# Patient Record
Sex: FEMALE | Race: ASIAN
[De-identification: names, ages, dates, MRNs, and addresses within clinical notes are randomized per-mention and may not be internally consistent; named-entity substitution may affect disease eponyms.]

---

## 2019-10-03 ENCOUNTER — HOSPITAL ENCOUNTER (EMERGENCY)
Dept: HOSPITAL 74 - JER | Age: 34
Discharge: HOME | End: 2019-10-03
Payer: COMMERCIAL

## 2019-10-03 VITALS — SYSTOLIC BLOOD PRESSURE: 122 MMHG | HEART RATE: 110 BPM | TEMPERATURE: 98.2 F | DIASTOLIC BLOOD PRESSURE: 92 MMHG

## 2019-10-03 VITALS — BODY MASS INDEX: 23.8 KG/M2
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
## 2019-10-03 DIAGNOSIS — S90.414A: Primary | ICD-10-CM
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
## 2020-07-28 ENCOUNTER — HOSPITAL ENCOUNTER (EMERGENCY)
Dept: HOSPITAL 74 - JER | Age: 35
Discharge: HOME | End: 2020-07-28
Payer: COMMERCIAL

## 2020-07-28 VITALS — TEMPERATURE: 98.1 F | DIASTOLIC BLOOD PRESSURE: 82 MMHG | HEART RATE: 86 BPM | SYSTOLIC BLOOD PRESSURE: 125 MMHG

## 2020-07-28 VITALS — BODY MASS INDEX: 24.5 KG/M2

## 2020-07-28 DIAGNOSIS — N30.01: ICD-10-CM

## 2020-07-28 DIAGNOSIS — N83.201: Primary | ICD-10-CM

## 2020-07-28 DIAGNOSIS — D25.9: ICD-10-CM

## 2020-07-28 LAB
ALBUMIN SERPL-MCNC: 3.6 G/DL (ref 3.4–5)
ALP SERPL-CCNC: 56 U/L (ref 45–117)
ALT SERPL-CCNC: 18 U/L (ref 13–61)
ANION GAP SERPL CALC-SCNC: 6 MMOL/L (ref 8–16)
APPEARANCE UR: (no result)
AST SERPL-CCNC: 13 U/L (ref 15–37)
BACTERIA # UR AUTO: 1835 /UL (ref 0–1359)
BASOPHILS # BLD: 0.3 % (ref 0–2)
BILIRUB SERPL-MCNC: 0.2 MG/DL (ref 0.2–1)
BILIRUB UR STRIP.AUTO-MCNC: NEGATIVE MG/DL
BUN SERPL-MCNC: 10.3 MG/DL (ref 7–18)
CALCIUM SERPL-MCNC: 8.5 MG/DL (ref 8.5–10.1)
CASTS URNS QL MICRO: 23 /UL (ref 0–3.1)
CHLORIDE SERPL-SCNC: 109 MMOL/L (ref 98–107)
CO2 SERPL-SCNC: 24 MMOL/L (ref 21–32)
COLOR UR: (no result)
CREAT SERPL-MCNC: 0.7 MG/DL (ref 0.55–1.3)
DEPRECATED RDW RBC AUTO: 12.7 % (ref 11.6–15.6)
EOSINOPHIL # BLD: 0.8 % (ref 0–4.5)
EPITH CASTS URNS QL MICRO: >36 /UL (ref 0–25.1)
GLUCOSE SERPL-MCNC: 99 MG/DL (ref 74–106)
HCT VFR BLD CALC: 37.9 % (ref 32.4–45.2)
HGB BLD-MCNC: 12.3 GM/DL (ref 10.7–15.3)
KETONES UR QL STRIP: (no result)
LEUKOCYTE ESTERASE UR QL STRIP.AUTO: (no result)
LYMPHOCYTES # BLD: 25.2 % (ref 8–40)
MCH RBC QN AUTO: 28.4 PG (ref 25.7–33.7)
MCHC RBC AUTO-ENTMCNC: 32.5 G/DL (ref 32–36)
MCV RBC: 87.3 FL (ref 80–96)
MONOCYTES # BLD AUTO: 4.8 % (ref 3.8–10.2)
NEUTROPHILS # BLD: 68.9 % (ref 42.8–82.8)
NITRITE UR QL STRIP: NEGATIVE
PH UR: 5 [PH] (ref 5–8)
PLATELET # BLD AUTO: 312 K/MM3 (ref 134–434)
PMV BLD: 9.1 FL (ref 7.5–11.1)
POTASSIUM SERPLBLD-SCNC: 4.2 MMOL/L (ref 3.5–5.1)
PROT SERPL-MCNC: 7.4 G/DL (ref 6.4–8.2)
PROT UR QL STRIP: (no result)
PROT UR QL STRIP: NEGATIVE
RBC # BLD AUTO: 226 /UL (ref 0–23.9)
RBC # BLD AUTO: 4.34 M/MM3 (ref 3.6–5.2)
SODIUM SERPL-SCNC: 139 MMOL/L (ref 136–145)
SP GR UR: 1.02 (ref 1.01–1.03)
UROBILINOGEN UR STRIP-MCNC: 1 MG/DL (ref 0.2–1)
WBC # BLD AUTO: 8.1 K/MM3 (ref 4–10)
WBC # UR AUTO: 87 /UL (ref 0–25.8)

## 2020-07-28 PROCEDURE — 3E033NZ INTRODUCTION OF ANALGESICS, HYPNOTICS, SEDATIVES INTO PERIPHERAL VEIN, PERCUTANEOUS APPROACH: ICD-10-PCS

## 2020-07-28 PROCEDURE — 3E0337Z INTRODUCTION OF ELECTROLYTIC AND WATER BALANCE SUBSTANCE INTO PERIPHERAL VEIN, PERCUTANEOUS APPROACH: ICD-10-PCS

## 2020-07-28 PROCEDURE — 3E033GC INTRODUCTION OF OTHER THERAPEUTIC SUBSTANCE INTO PERIPHERAL VEIN, PERCUTANEOUS APPROACH: ICD-10-PCS

## 2020-07-28 NOTE — PDOC
History of Present Illness





- General


Chief Complaint: Pain


Stated Complaint: ABD PAIN


Time Seen by Provider: 07/28/20 05:10


History Source: Patient





- History of Present Illness


Initial Comments: 





07/28/20 05:44


34 year old female c/o right sided pelvic pain radiating to the lower back. 

patient reports nausea, denies vomiting, dysuria, urinary symptoms. patient 

reports that she is currently on her period. reports 2 periods this month which 

is unusual.  denies abdominal pain








PMHX: disc disease; appendectomy








07/28/20 06:09








Past History





- Medical History


Allergies/Adverse Reactions: 


                                    Allergies











Allergy/AdvReac Type Severity Reaction Status Date / Time


 


No Known Allergies Allergy   Verified 07/28/20 04:54











Home Medications: 


Ambulatory Orders





Cephalexin Monohydrate [Keflex -] 500 mg PO BID #14 capsule 07/28/20 


Ibuprofen 600 mg PO Q6H #30 tablet 07/28/20 








COPD: No





- Surgical History


Appendectomy: Yes





- Psycho-Social/Smoking History


Smoking History: Never smoked


Have you smoked in the past 12 months: No


Information on smoking cessation initiated: No





- Substance Abuse Hx (Audit-C & DAST Scrn)


How often the patient has a drink containing alcohol: Never


Score: In Men: 4 or > Positive; In Women: 3 or > Positive: 0


Screen Result (Pos requires Nsg. Audit-10AR): Negative


In the last yr the pt used illegal drug/Rx for NonMed reason: No


Score:  Yes response is considered Positive: 0


Screen Result (Positive result requires Nsg. DAST-10): Negative





**Review of Systems





- Review of Systems


Able to Perform ROS?: Yes


Is the patient limited English proficient: No


Constitutional: No: Symptoms Reported, See HPI, Chills, Diaphoresis, Fever, Loss

of Appetite, Malaise, Night Sweats, Weakness, Weight Stable, Unintentional Wgt. 

Loss, Unexplained wgt Loss, Other


ABD/GI: Yes: Nausea, Other (pelvic pain)


: No: Symptoms Reported, See HPI, Burning, Dysuria, Discharge, Frequency, 

Flank Pain, Hematuria, Incontinence, Pain, Urgency, Testicular Mass, Testicular 

Swelling, Lesions, Testicular Pain, Other





*Physical Exam





- Vital Signs


                                Last Vital Signs











Temp Pulse Resp BP Pulse Ox


 


 98.2 F   82   18   106/72   99 


 


 07/28/20 04:40  07/28/20 04:40  07/28/20 04:40  07/28/20 04:40  07/28/20 04:40














- Physical Exam


General Appearance: Yes: Appropriately Dressed


Respiratory/Chest: positive: Lungs Clear, Normal Breath Sounds


Female Pelvic Exam: positive: normal external exam, cervical os closed, adnexal 

tenderness (b/l adnexal tenderness), vaginal bleeding


Gastrointestinal/Abdominal: positive: Normal Bowel Sounds, Soft, Other (right p

elvic area pain).  negative: Tender


Musculoskeletal: positive: Normal Inspection.  negative: CVA Tenderness


Extremity: positive: Normal Capillary Refill, Normal Inspection


Integumentary: positive: Normal Color, Dry, Warm


Neurologic: positive: Fully Oriented, Alert, Normal Mood/Affect





ED Treatment Course





- LABORATORY


CBC & Chemistry Diagram: 


                                 07/28/20 05:35





                                 07/28/20 05:35





ED Progress Note





- Progress Note


Progress Note: 





07/28/20 06:10


A: pelvic pain








P: cbc





cmp





ua





urine pregnancy





transvaginal US





Medical Decision Making





- Medical Decision Making





07/28/20 0700





patient signed out to sasha GRACIA. pending labs and US





Discharge





- Discharge Information


Problems reviewed: Yes


Clinical Impression/Diagnosis: 


 Pelvic pain





Ovarian cyst


Qualifiers:


 Laterality: right Qualified Code(s): N83.201 - Unspecified ovarian cyst, right 

side





Uterine fibroid


Qualifiers:


 Uterine leiomyoma location: unspecified location Qualified Code(s): D25.9 - 

Leiomyoma of uterus, unspecified





UTI (urinary tract infection)


Qualifiers:


 Urinary tract infection type: acute cystitis Hematuria presence: with hematuria

Qualified Code(s): N30.01 - Acute cystitis with hematuria





Condition: Fair


Disposition: HOME





- Additional Discharge Information


Prescriptions: 


Ibuprofen 600 mg PO Q6H #30 tablet


Cephalexin Monohydrate [Keflex -] 500 mg PO BID #14 capsule





- Follow up/Referral


Referrals: 


Sagar Del Rio MD [Primary Care Provider] - 


Skyler Villalba MD [Staff Physician] - 





- Patient Discharge Instructions


Patient Printed Discharge Instructions:  DI for Uterine Fibroids, DI for Urinary

Tract Infection (UTI), DI for Ovarian Cyst


Additional Instructions: 


You were seen today for your pelvic pain.


Your urine shows a urinary tract infection.  Please take the antibiotics as 

directed.  Finish the entire dose even if you feel better.


Drink plenty of fluids.


You also have an ovarian cyst as well as a uterine fibroid.  This is probably 

the cause of your dysfunctional bleeding.


You may take Motrin 600 mg every 6 hours as needed for pain.


Please follow-up with OB/GYN this week.  A referral has been provided to you.





Return to the ER for increased bleeding, soaking 1 pad an hour or more, 

increased pain, vomiting or if you have any changes in your symptoms





- Post Discharge Activity


Work/Back to School Note:  Back to Work

## 2020-07-28 NOTE — PDOC
*Physical Exam





- Vital Signs


                                Last Vital Signs











Temp Pulse Resp BP Pulse Ox


 


 98.2 F   82   18   106/72   99 


 


 07/28/20 04:40  07/28/20 04:40  07/28/20 04:40  07/28/20 04:40  07/28/20 04:40














- Physical Exam


General Appearance: Yes: Nourished, Appropriately Dressed.  No: Apparent 

Distress





ED Treatment Course





- LABORATORY


CBC & Chemistry Diagram: 


                                 07/28/20 05:35





                                 07/28/20 05:35





- ADDITIONAL ORDERS


Additional order review: 


                               Laboratory  Results











  07/28/20 07/28/20





  05:35 05:30


 


Sodium  139 


 


Potassium  4.2 


 


Chloride  109 H 


 


Carbon Dioxide  24 


 


Anion Gap  6 L 


 


BUN  10.3 


 


Creatinine  0.7 


 


Est GFR (CKD-EPI)AfAm  132.87 


 


Est GFR (CKD-EPI)NonAf  114.64 


 


Random Glucose  99 


 


Calcium  8.5 


 


Total Bilirubin  0.2 


 


AST  13 L 


 


ALT  18 


 


Alkaline Phosphatase  56 


 


Total Protein  7.4 


 


Albumin  3.6 


 


Urine Color   Dk yellow


 


Urine Appearance   Cloudy


 


Urine pH   5.0


 


Ur Specific Gravity   1.024


 


Urine Protein   1+ H


 


Urine Glucose (UA)   Negative


 


Urine Ketones   Trace H


 


Urine Blood   3+ H


 


Urine Nitrite   Negative


 


Urine Bilirubin   Negative


 


Urine Urobilinogen   1.0


 


Ur Leukocyte Esterase   Trace


 


Urine WBC (Auto)   87


 


Urine RBC (Auto)   226


 


Urine Casts (Auto)   23


 


U Epithel Cells (Auto)   >36


 


Urine Bacteria (Auto)   1835


 


Urine HCG, Qual   Negative








                                        











  07/28/20





  05:35


 


RBC  4.34


 


MCV  87.3


 


MCHC  32.5


 


RDW  12.7


 


MPV  9.1


 


Neutrophils %  68.9


 


Lymphocytes %  25.2


 


Monocytes %  4.8


 


Eosinophils %  0.8


 


Basophils %  0.3














- Medications


Given in the ED: 


ED Medications














Discontinued Medications














Generic Name Dose Route Start Last Admin





  Trade Name Freq  PRN Reason Stop Dose Admin


 


Acetaminophen  1,000 mg  07/28/20 05:29  07/28/20 06:17





  Ofirmev Injection -  IVPB  07/28/20 05:30  1,000 mg





  ONCE ONE   Administration


 


Sodium Chloride  1,000 mls @ 1,000 mls/hr  07/28/20 05:29  07/28/20 06:17





  Normal Saline -  IV  07/28/20 06:28  1,000 mls/hr





  ASDIR STA   Administration


 


Ondansetron HCl  4 mg  07/28/20 05:43  07/28/20 06:17





  Zofran Injection  IVPUSH  07/28/20 05:44  4 mg





  ONCE ONE   Administration














Medical Decision Making





- Medical Decision Making





07/28/20 07:50


Sign out received from JOAQUÍN Whaley at 0700


Pt sleeping in vertical


Pending TVUS for reported pelvic pain/adenexal pain on previous gyn exam


Re-evaluate





07/28/20 10:31


Patient with a small uterine fibroid on ultrasound, also small right-sided 

ovarian cyst.


Likely cause for increased dysfunctional uterine bleeding and pain.


We will treat with ibuprofen.


Urine also appears infected.  We will treat with Keflex.


Discharge home


I discussed the physical exam findings, ancillary test results and final 

diagnoses with the patient. I answered all of the patient's questions. The p

atient was satisfied with the care received and felt comfortable with the 

discharge plan and treatment plan.  The Patient agrees to follow up with the 

primary care physician/specialist within 24-72 hours. Return precautions were 

given.





Discharge





- Discharge Information


Problems reviewed: Yes


Clinical Impression/Diagnosis: 


 Pelvic pain





Ovarian cyst


Qualifiers:


 Laterality: right Qualified Code(s): N83.201 - Unspecified ovarian cyst, right 

side





Uterine fibroid


Qualifiers:


 Uterine leiomyoma location: unspecified location Qualified Code(s): D25.9 - 

Leiomyoma of uterus, unspecified





UTI (urinary tract infection)


Qualifiers:


 Urinary tract infection type: acute cystitis Hematuria presence: with hematuria

Qualified Code(s): N30.01 - Acute cystitis with hematuria





Condition: Fair


Disposition: HOME





- Admission


No





- Follow up/Referral


Referrals: 


Sagar Del Rio MD [Primary Care Provider] - 


Skyler Villalba MD [Staff Physician] - 





- Patient Discharge Instructions


Patient Printed Discharge Instructions:  DI for Uterine Fibroids, DI for Ovarian

Cyst, DI for Urinary Tract Infection (UTI)


Additional Instructions: 


You were seen today for your pelvic pain.


Your urine shows a urinary tract infection.  Please take the antibiotics as 

directed.  Finish the entire dose even if you feel better.


Drink plenty of fluids.


You also have an ovarian cyst as well as a uterine fibroid.  This is probably 

the cause of your dysfunctional bleeding.


You may take Motrin 600 mg every 6 hours as needed for pain.


Please follow-up with OB/GYN this week.  A referral has been provided to you.





Return to the ER for increased bleeding, soaking 1 pad an hour or more, 

increased pain, vomiting or if you have any changes in your symptoms





- Post Discharge Activity


Work/Back to School Note:  Back to Work

## 2020-07-28 NOTE — PDOC
*Physical Exam





- Vital Signs


                                Last Vital Signs











Temp Pulse Resp BP Pulse Ox


 


 98.2 F   82   18   106/72   99 


 


 07/28/20 04:40  07/28/20 04:40  07/28/20 04:40  07/28/20 04:40  07/28/20 04:40














ED Treatment Course





- LABORATORY


CBC & Chemistry Diagram: 


                                 07/28/20 05:35





                                 07/28/20 05:35





Medical Decision Making





- Medical Decision Making





07/28/20 05:34


Patient seen by the advanced practice provider under my  supervision. Ancillary 

testing reviewed as necessary.


I agree with plan as outlined by the advanced practice provider.





Discharge





- Discharge Information


Problems reviewed: Yes


Clinical Impression/Diagnosis: 


 Pelvic pain





Ovarian cyst


Qualifiers:


 Laterality: right Qualified Code(s): N83.201 - Unspecified ovarian cyst, right 

side





Uterine fibroid


Qualifiers:


 Uterine leiomyoma location: unspecified location Qualified Code(s): D25.9 - 

Leiomyoma of uterus, unspecified





UTI (urinary tract infection)


Qualifiers:


 Urinary tract infection type: acute cystitis Hematuria presence: with hematuria

Qualified Code(s): N30.01 - Acute cystitis with hematuria





Condition: Fair


Disposition: HOME





- Additional Discharge Information


Prescriptions: 


Ibuprofen 600 mg PO Q6H #30 tablet


Cephalexin Monohydrate [Keflex -] 500 mg PO BID #14 capsule





- Follow up/Referral


Referrals: 


Sagar Del Rio MD [Primary Care Provider] - 


Skyler Villalba MD [Staff Physician] - 





- Patient Discharge Instructions


Patient Printed Discharge Instructions:  DI for Uterine Fibroids, DI for Urinary

Tract Infection (UTI), DI for Ovarian Cyst


Additional Instructions: 


You were seen today for your pelvic pain.


Your urine shows a urinary tract infection.  Please take the antibiotics as dir

ected.  Finish the entire dose even if you feel better.


Drink plenty of fluids.


You also have an ovarian cyst as well as a uterine fibroid.  This is probably 

the cause of your dysfunctional bleeding.


You may take Motrin 600 mg every 6 hours as needed for pain.


Please follow-up with OB/GYN this week.  A referral has been provided to you.





Return to the ER for increased bleeding, soaking 1 pad an hour or more, 

increased pain, vomiting or if you have any changes in your symptoms





- Post Discharge Activity


Work/Back to School Note:  Back to Work

## 2021-07-07 ENCOUNTER — HOSPITAL ENCOUNTER (EMERGENCY)
Dept: HOSPITAL 74 - JER | Age: 36
LOS: 1 days | Discharge: HOME | End: 2021-07-08
Payer: COMMERCIAL

## 2021-07-07 VITALS — DIASTOLIC BLOOD PRESSURE: 77 MMHG | HEART RATE: 97 BPM | SYSTOLIC BLOOD PRESSURE: 117 MMHG | TEMPERATURE: 98 F

## 2021-07-07 VITALS — BODY MASS INDEX: 26.5 KG/M2

## 2021-07-07 DIAGNOSIS — N83.291: ICD-10-CM

## 2021-07-07 DIAGNOSIS — R10.31: Primary | ICD-10-CM

## 2021-07-07 DIAGNOSIS — D25.9: ICD-10-CM

## 2021-07-07 LAB
ALBUMIN SERPL-MCNC: 4.4 G/DL (ref 3.4–5)
ALP SERPL-CCNC: 94 U/L (ref 45–117)
ALT SERPL-CCNC: 30 U/L (ref 13–61)
ANION GAP SERPL CALC-SCNC: 8 MMOL/L (ref 8–16)
APPEARANCE UR: (no result)
AST SERPL-CCNC: 22 U/L (ref 15–37)
BACTERIA # UR AUTO: 710 /UL (ref 0–1359)
BILIRUB SERPL-MCNC: 0.4 MG/DL (ref 0.2–1)
BILIRUB UR STRIP.AUTO-MCNC: NEGATIVE MG/DL
BUN SERPL-MCNC: 9.4 MG/DL (ref 7–18)
CALCIUM SERPL-MCNC: 8.9 MG/DL (ref 8.5–10.1)
CASTS URNS QL MICRO: 3 /UL (ref 0–3.1)
CHLORIDE SERPL-SCNC: 106 MMOL/L (ref 98–107)
CO2 SERPL-SCNC: 25 MMOL/L (ref 21–32)
COLOR UR: YELLOW
CREAT SERPL-MCNC: 0.6 MG/DL (ref 0.55–1.3)
DEPRECATED RDW RBC AUTO: 13.2 % (ref 11.6–15.6)
EPITH CASTS URNS QL MICRO: 25 /UL (ref 0–25.1)
GLUCOSE SERPL-MCNC: 82 MG/DL (ref 74–106)
HCT VFR BLD CALC: 42 % (ref 32.4–45.2)
HGB BLD-MCNC: 13.7 GM/DL (ref 10.7–15.3)
KETONES UR QL STRIP: (no result)
LEUKOCYTE ESTERASE UR QL STRIP.AUTO: NEGATIVE
MCH RBC QN AUTO: 27.9 PG (ref 25.7–33.7)
MCHC RBC AUTO-ENTMCNC: 32.5 G/DL (ref 32–36)
MCV RBC: 85.6 FL (ref 80–96)
NITRITE UR QL STRIP: NEGATIVE
PH UR: 6 [PH] (ref 5–8)
PLATELET # BLD AUTO: 325 10^3/UL (ref 134–434)
PMV BLD: 8.5 FL (ref 7.5–11.1)
PROT SERPL-MCNC: 8.6 G/DL (ref 6.4–8.2)
PROT UR QL STRIP: (no result)
PROT UR QL STRIP: NEGATIVE
RBC # BLD AUTO: 37 /UL (ref 0–23.9)
RBC # BLD AUTO: 4.9 M/MM3 (ref 3.6–5.2)
SODIUM SERPL-SCNC: 139 MMOL/L (ref 136–145)
SP GR UR: 1.03 (ref 1.01–1.03)
UROBILINOGEN UR STRIP-MCNC: 1 MG/DL (ref 0.2–1)
WBC # BLD AUTO: 11.7 K/MM3 (ref 4–10)
WBC # UR AUTO: 12 /UL (ref 0–25.8)

## 2021-07-07 PROCEDURE — 3E033NZ INTRODUCTION OF ANALGESICS, HYPNOTICS, SEDATIVES INTO PERIPHERAL VEIN, PERCUTANEOUS APPROACH: ICD-10-PCS

## 2021-07-07 PROCEDURE — 3E0337Z INTRODUCTION OF ELECTROLYTIC AND WATER BALANCE SUBSTANCE INTO PERIPHERAL VEIN, PERCUTANEOUS APPROACH: ICD-10-PCS

## 2021-07-07 PROCEDURE — 3E033GC INTRODUCTION OF OTHER THERAPEUTIC SUBSTANCE INTO PERIPHERAL VEIN, PERCUTANEOUS APPROACH: ICD-10-PCS

## 2023-06-02 ENCOUNTER — OFFICE (OUTPATIENT)
Dept: URBAN - METROPOLITAN AREA CLINIC 29 | Facility: CLINIC | Age: 38
Setting detail: OPHTHALMOLOGY
End: 2023-06-02
Payer: COMMERCIAL

## 2023-06-02 ENCOUNTER — RX ONLY (RX ONLY)
Age: 38
End: 2023-06-02

## 2023-06-02 DIAGNOSIS — H35.361: ICD-10-CM

## 2023-06-02 DIAGNOSIS — H04.123: ICD-10-CM

## 2023-06-02 DIAGNOSIS — G43.009: ICD-10-CM

## 2023-06-02 DIAGNOSIS — H35.3131: ICD-10-CM

## 2023-06-02 PROCEDURE — 92134 CPTRZ OPH DX IMG PST SGM RTA: CPT | Performed by: OPHTHALMOLOGY

## 2023-06-02 PROCEDURE — 92004 COMPRE OPH EXAM NEW PT 1/>: CPT | Performed by: OPHTHALMOLOGY

## 2023-06-02 ASSESSMENT — CONFRONTATIONAL VISUAL FIELD TEST (CVF)
OS_FINDINGS: FULL
OD_FINDINGS: FULL

## 2023-06-02 ASSESSMENT — TEAR BREAK UP TIME (TBUT)
OS_TBUT: T
OD_TBUT: T

## 2023-06-02 ASSESSMENT — VISUAL ACUITY
OS_BCVA: 20/25-2
OD_BCVA: 20/40

## 2023-10-29 ENCOUNTER — HOSPITAL ENCOUNTER (EMERGENCY)
Dept: HOSPITAL 74 - JER | Age: 38
Discharge: HOME | End: 2023-10-29
Payer: COMMERCIAL

## 2023-10-29 VITALS
TEMPERATURE: 98.2 F | DIASTOLIC BLOOD PRESSURE: 74 MMHG | RESPIRATION RATE: 18 BRPM | SYSTOLIC BLOOD PRESSURE: 113 MMHG | HEART RATE: 97 BPM

## 2023-10-29 VITALS — BODY MASS INDEX: 26.4 KG/M2

## 2023-10-29 DIAGNOSIS — D25.9: Primary | ICD-10-CM

## 2023-10-29 LAB
ANION GAP SERPL CALC-SCNC: 6 MMOL/L (ref 4–13)
ANION GAP SERPL CALC-SCNC: 7 MMOL/L (ref 4–13)
BASOPHILS # BLD: 0.3 % (ref 0–2)
BUN SERPL-MCNC: 11.1 MG/DL (ref 7–18)
BUN SERPL-MCNC: 11.5 MG/DL (ref 7–18)
CALCIUM SERPL-MCNC: 8.8 MG/DL (ref 8.5–10.1)
CALCIUM SERPL-MCNC: 8.9 MG/DL (ref 8.5–10.1)
CHLORIDE SERPL-SCNC: 110 MMOL/L (ref 98–107)
CHLORIDE SERPL-SCNC: 110 MMOL/L (ref 98–107)
CO2 SERPL-SCNC: 22 MMOL/L (ref 21–32)
CO2 SERPL-SCNC: 22 MMOL/L (ref 21–32)
CREAT SERPL-MCNC: 0.6 MG/DL (ref 0.55–1.3)
CREAT SERPL-MCNC: 0.7 MG/DL (ref 0.55–1.3)
DEPRECATED RDW RBC AUTO: 13.5 % (ref 11.6–15.6)
EOSINOPHIL # BLD: 1.5 % (ref 0–4.5)
GLUCOSE SERPL-MCNC: 105 MG/DL (ref 74–106)
GLUCOSE SERPL-MCNC: 92 MG/DL (ref 74–106)
HCT VFR BLD CALC: 41 % (ref 32.4–45.2)
HGB BLD-MCNC: 13.5 GM/DL (ref 10.7–15.3)
LYMPHOCYTES # BLD: 28.5 % (ref 8–40)
MCH RBC QN AUTO: 28.5 PG (ref 25.7–33.7)
MCHC RBC AUTO-ENTMCNC: 32.8 G/DL (ref 32–36)
MCV RBC: 86.8 FL (ref 80–96)
MONOCYTES # BLD AUTO: 8.1 % (ref 3.8–10.2)
NEUTROPHILS # BLD: 61.6 % (ref 42.8–82.8)
PLATELET # BLD AUTO: 312 10^3/UL (ref 134–434)
PMV BLD: 8.4 FL (ref 7.5–11.1)
POTASSIUM SERPLBLD-SCNC: 4.2 MMOL/L (ref 3.5–5.1)
POTASSIUM SERPLBLD-SCNC: 5.7 MMOL/L (ref 3.5–5.1)
RBC # BLD AUTO: 4.72 M/MM3 (ref 3.6–5.2)
SODIUM SERPL-SCNC: 137 MMOL/L (ref 136–145)
SODIUM SERPL-SCNC: 139 MMOL/L (ref 136–145)
WBC # BLD AUTO: 9 K/MM3 (ref 4–10)

## 2024-10-28 ENCOUNTER — EMERGENCY (EMERGENCY)
Facility: HOSPITAL | Age: 39
LOS: 1 days | Discharge: LEFT BEFORE DISCHARGE | End: 2024-10-28
Admitting: EMERGENCY MEDICINE
Payer: MEDICAID

## 2024-10-28 VITALS
OXYGEN SATURATION: 100 % | RESPIRATION RATE: 16 BRPM | DIASTOLIC BLOOD PRESSURE: 78 MMHG | SYSTOLIC BLOOD PRESSURE: 121 MMHG | HEART RATE: 110 BPM | TEMPERATURE: 98 F

## 2024-10-28 VITALS
HEART RATE: 128 BPM | DIASTOLIC BLOOD PRESSURE: 74 MMHG | RESPIRATION RATE: 16 BRPM | SYSTOLIC BLOOD PRESSURE: 120 MMHG | OXYGEN SATURATION: 99 % | WEIGHT: 134.92 LBS | TEMPERATURE: 99 F

## 2024-10-28 LAB
ALBUMIN SERPL ELPH-MCNC: 4.3 G/DL — SIGNIFICANT CHANGE UP (ref 3.3–5)
ALP SERPL-CCNC: 70 U/L — SIGNIFICANT CHANGE UP (ref 40–120)
ALT FLD-CCNC: 11 U/L — SIGNIFICANT CHANGE UP (ref 4–33)
ANION GAP SERPL CALC-SCNC: 13 MMOL/L — SIGNIFICANT CHANGE UP (ref 7–14)
APPEARANCE UR: ABNORMAL
AST SERPL-CCNC: 13 U/L — SIGNIFICANT CHANGE UP (ref 4–32)
BACTERIA # UR AUTO: ABNORMAL /HPF
BASOPHILS # BLD AUTO: 0.02 K/UL — SIGNIFICANT CHANGE UP (ref 0–0.2)
BASOPHILS NFR BLD AUTO: 0.2 % — SIGNIFICANT CHANGE UP (ref 0–2)
BILIRUB SERPL-MCNC: 0.2 MG/DL — SIGNIFICANT CHANGE UP (ref 0.2–1.2)
BILIRUB UR-MCNC: NEGATIVE — SIGNIFICANT CHANGE UP
BUN SERPL-MCNC: 10 MG/DL — SIGNIFICANT CHANGE UP (ref 7–23)
CALCIUM SERPL-MCNC: 9.3 MG/DL — SIGNIFICANT CHANGE UP (ref 8.4–10.5)
CAST: 1 /LPF — SIGNIFICANT CHANGE UP (ref 0–4)
CHLORIDE SERPL-SCNC: 104 MMOL/L — SIGNIFICANT CHANGE UP (ref 98–107)
CO2 SERPL-SCNC: 20 MMOL/L — LOW (ref 22–31)
COLOR SPEC: YELLOW — SIGNIFICANT CHANGE UP
CREAT SERPL-MCNC: 0.54 MG/DL — SIGNIFICANT CHANGE UP (ref 0.5–1.3)
DIFF PNL FLD: NEGATIVE — SIGNIFICANT CHANGE UP
EGFR: 122 ML/MIN/1.73M2 — SIGNIFICANT CHANGE UP
EOSINOPHIL # BLD AUTO: 0.07 K/UL — SIGNIFICANT CHANGE UP (ref 0–0.5)
EOSINOPHIL NFR BLD AUTO: 0.7 % — SIGNIFICANT CHANGE UP (ref 0–6)
GLUCOSE SERPL-MCNC: 127 MG/DL — HIGH (ref 70–99)
GLUCOSE UR QL: NEGATIVE MG/DL — SIGNIFICANT CHANGE UP
HCG SERPL-ACNC: 7397 MIU/ML — SIGNIFICANT CHANGE UP
HCT VFR BLD CALC: 39.3 % — SIGNIFICANT CHANGE UP (ref 34.5–45)
HGB BLD-MCNC: 13.1 G/DL — SIGNIFICANT CHANGE UP (ref 11.5–15.5)
IANC: 7.01 K/UL — SIGNIFICANT CHANGE UP (ref 1.8–7.4)
IMM GRANULOCYTES NFR BLD AUTO: 0.8 % — SIGNIFICANT CHANGE UP (ref 0–0.9)
KETONES UR-MCNC: NEGATIVE MG/DL — SIGNIFICANT CHANGE UP
LEUKOCYTE ESTERASE UR-ACNC: NEGATIVE — SIGNIFICANT CHANGE UP
LYMPHOCYTES # BLD AUTO: 2.26 K/UL — SIGNIFICANT CHANGE UP (ref 1–3.3)
LYMPHOCYTES # BLD AUTO: 22.6 % — SIGNIFICANT CHANGE UP (ref 13–44)
MCHC RBC-ENTMCNC: 29 PG — SIGNIFICANT CHANGE UP (ref 27–34)
MCHC RBC-ENTMCNC: 33.3 GM/DL — SIGNIFICANT CHANGE UP (ref 32–36)
MCV RBC AUTO: 87.1 FL — SIGNIFICANT CHANGE UP (ref 80–100)
MONOCYTES # BLD AUTO: 0.55 K/UL — SIGNIFICANT CHANGE UP (ref 0–0.9)
MONOCYTES NFR BLD AUTO: 5.5 % — SIGNIFICANT CHANGE UP (ref 2–14)
NEUTROPHILS # BLD AUTO: 7.01 K/UL — SIGNIFICANT CHANGE UP (ref 1.8–7.4)
NEUTROPHILS NFR BLD AUTO: 70.2 % — SIGNIFICANT CHANGE UP (ref 43–77)
NITRITE UR-MCNC: NEGATIVE — SIGNIFICANT CHANGE UP
NRBC # BLD: 0 /100 WBCS — SIGNIFICANT CHANGE UP (ref 0–0)
NRBC # FLD: 0 K/UL — SIGNIFICANT CHANGE UP (ref 0–0)
PH UR: 7 — SIGNIFICANT CHANGE UP (ref 5–8)
PLATELET # BLD AUTO: 297 K/UL — SIGNIFICANT CHANGE UP (ref 150–400)
POTASSIUM SERPL-MCNC: 3.9 MMOL/L — SIGNIFICANT CHANGE UP (ref 3.5–5.3)
POTASSIUM SERPL-SCNC: 3.9 MMOL/L — SIGNIFICANT CHANGE UP (ref 3.5–5.3)
PROT SERPL-MCNC: 7.1 G/DL — SIGNIFICANT CHANGE UP (ref 6–8.3)
PROT UR-MCNC: NEGATIVE MG/DL — SIGNIFICANT CHANGE UP
RBC # BLD: 4.51 M/UL — SIGNIFICANT CHANGE UP (ref 3.8–5.2)
RBC # FLD: 12.9 % — SIGNIFICANT CHANGE UP (ref 10.3–14.5)
RBC CASTS # UR COMP ASSIST: 1 /HPF — SIGNIFICANT CHANGE UP (ref 0–4)
SODIUM SERPL-SCNC: 137 MMOL/L — SIGNIFICANT CHANGE UP (ref 135–145)
SP GR SPEC: 1.01 — SIGNIFICANT CHANGE UP (ref 1–1.03)
SQUAMOUS # UR AUTO: 17 /HPF — HIGH (ref 0–5)
UROBILINOGEN FLD QL: 0.2 MG/DL — SIGNIFICANT CHANGE UP (ref 0.2–1)
WBC # BLD: 9.99 K/UL — SIGNIFICANT CHANGE UP (ref 3.8–10.5)
WBC # FLD AUTO: 9.99 K/UL — SIGNIFICANT CHANGE UP (ref 3.8–10.5)
WBC UR QL: 5 /HPF — SIGNIFICANT CHANGE UP (ref 0–5)

## 2024-10-28 PROCEDURE — 76817 TRANSVAGINAL US OBSTETRIC: CPT | Mod: 26

## 2024-10-28 PROCEDURE — 99285 EMERGENCY DEPT VISIT HI MDM: CPT

## 2024-10-28 PROCEDURE — 99282 EMERGENCY DEPT VISIT SF MDM: CPT | Mod: GC

## 2024-10-28 PROCEDURE — 93010 ELECTROCARDIOGRAM REPORT: CPT

## 2024-10-28 RX ORDER — SODIUM CHLORIDE 0.9 % (FLUSH) 0.9 %
1000 SYRINGE (ML) INJECTION ONCE
Refills: 0 | Status: COMPLETED | OUTPATIENT
Start: 2024-10-28 | End: 2024-10-28

## 2024-10-28 RX ADMIN — Medication 1000 MILLILITER(S): at 12:35

## 2024-10-28 NOTE — ED ADULT TRIAGE NOTE - CHIEF COMPLAINT QUOTE
Pt presents to ED ambulatory from home with c/o for repeat BHCG. Pt was seen at another hospital after a trip and fall, was found to be pregnant, was unable to see pregnancy on ultrasound and advised to come to ED 48 hrs later for repeat lab work for rule out ectopic pregnancy. Pt denies vaginal bleeding or pain.

## 2024-10-28 NOTE — ED PROVIDER NOTE - OBJECTIVE STATEMENT
38 yo female  with no pmhx presents to the ED for repeat HCG and US to r/o ectopic. pt states on 10/24 she had a mechanical fall where she was seen in an ED and was found to be pregnant. pt states at the time they were not able to see a IUP and was told to return for repeat HCG. pt states she followed up in [facility] on 10/26 and was told that she needs an injection to terminate the pregnancy, however pt refused and came to the ED today. pt states this is a desired pregnancy. pt denies abd pain, vaginal bleeding or discharge, fever, chills, N/V.   pt states she is feeling anxious to find out the results. denies CP or SOB.

## 2024-10-28 NOTE — CONSULT NOTE ADULT - ASSESSMENT
Pt is a 40yo  LMP  (GA 6w0d) presenting w/positive HCG 10/24 from outside hospital and 10/26 TVUS showing no IUP. HCG 7397.0, TVUS showing 4mm early intrauterine gestational sac without definite yolk sac or fetal pole. Pt mildly tachycardic, now 110 bpm, w/hematocrit 39.3. No abd pain or vaginal bleeding. Imaging c/w PUL, possible early IUP.     Recs:  - Pt stable from GYN perspective  - Outpt followup in 1-2 days for repeat beta-HCG and TVUS. Recommended that pt f/u w/[facility], pt declined and would like to follow w/[facility].   - Return precautions provided: sharp/sudden unilateral pain, being unable to stand/walk, not relieved w/Tylenol; heavy vaginal bleeding leading to saturation of pads or symptoms of anemia (SOB, etc.).  - Clinic: [phone]. GYN team to coordinate w/clinic.    Pt is a 40yo  LMP  (GA 6w0d) presenting w/positive HCG 10/24 from outside hospital and 10/26 TVUS showing no IUP. HCG 7397.0, TVUS showing 4mm early intrauterine gestational sac without definite yolk sac or fetal pole. Pt mildly tachycardic, now 110 bpm, w/hematocrit 39.3. No abd pain or vaginal bleeding. Imaging c/w PUL, possible early IUP.     Recs:  - Pt stable from GYN perspective  - Outpt followup in 1-2 days for repeat beta-HCG and TVUS. Recommended that pt f/u w/[facility], pt declined and would like to follow w/[facility].   - Return precautions provided: sharp/sudden unilateral pain, being unable to stand/walk, not relieved w/Tylenol; heavy vaginal bleeding leading to saturation of pads or symptoms of anemia (SOB, etc.).  - Clinic: [phone]. GYN team to coordinate w/clinic.     Freddy Sheppard PGY1   Dw Dr. Toth

## 2024-10-28 NOTE — ED PROVIDER NOTE - CLINICAL SUMMARY MEDICAL DECISION MAKING FREE TEXT BOX
38 yo female  with no pmhx presents to the ED for repeat HCG and US to r/o ectopic.   pt denies any symptoms with exception to feeling anxious  will obtain labs, US, IV hydration.

## 2024-10-28 NOTE — CONSULT NOTE ADULT - SUBJECTIVE AND OBJECTIVE BOX
RAVIN BOUDREAUX  39y  Female [id number]    HPI: 40yo  LMP  presenting to the ED for repeat HCG and US to r/o ectopic. pt states on 10/24 she had a mechanical fall where she was seen in an ED and was found to be pregnant. pt states at the time they were not able to see a IUP and was told to return for repeat HCG. pt states she followed up in [facility] on 10/26 and was told that she needs an injection to terminate the pregnancy, however pt refused and came to the ED today. This is a desired pregnancy. Pt follows with [facility] but presented to [facility] while she was visiting family. Pt states this is a desired pregnancy. Pt denies abd pain, vaginal bleeding or discharge, fever, chills, N/V. Endorsed SOB last night, now resolved.     Pt wishes to follow with [facility] clinic. Insurance: [employer] [facility]      Name of GYN Physician: Follows w/Dr. Elisabeth Georges, [facility]   OBHx: 33w  3#, otherwise uncomp  GynHx: fibroids, cysts, adenomyosis; endorses regular cycles w/heavy menses  PMH: as per GynHx  PSH: s/p appendectomy, s/p neck surgery  Meds: denies  Allx: NKDA  Social History:  Denies smoking use, drug use, alcohol use.      Vital Signs Last 24 Hrs  T(C): 37.1 (28 Oct 2024 11:40), Max: 37.1 (28 Oct 2024 11:40)  T(F): 98.8 (28 Oct 2024 11:40), Max: 98.8 (28 Oct 2024 11:40)  HR: 113 (28 Oct 2024 14:49) (113 - 128)  BP: 103/59 (28 Oct 2024 14:49) (103/59 - 120/74)  BP(mean): --  RR: 16 (28 Oct 2024 14:49) (16 - 16)  SpO2: 100% (28 Oct 2024 14:49) (99% - 100%)    Parameters below as of 28 Oct 2024 14:49  Patient On (Oxygen Delivery Method): room air        Physical Exam:   General: sitting comfortably in bed, NAD   CV: RRR  Lungs: CTAB  Abd: Soft, non-tender, non-distended.    : Denied vaginal bleeding  Speculum Exam: No active bleeding from os.  Physiologic discharge.    Ext: no calf pain to palpation         LABS:                        13.1   9.99  )-----------( 297      ( 28 Oct 2024 12:45 )             39.3     10-28    137  |  104  |  10  ----------------------------<  127[H]  3.9   |  20[L]  |  0.54    Ca    9.3      28 Oct 2024 12:45    TPro  7.1  /  Alb  4.3  /  TBili  0.2  /  DBili  x   /  AST  13  /  ALT  11  /  AlkPhos  70  10-28      Urinalysis Basic - ( 28 Oct 2024 12:47 )    Color: Yellow / Appearance: Cloudy / S.008 / pH: x  Gluc: x / Ketone: Negative mg/dL  / Bili: Negative / Urobili: 0.2 mg/dL   Blood: x / Protein: Negative mg/dL / Nitrite: Negative   Leuk Esterase: Negative / RBC: 1 /HPF / WBC 5 /HPF   Sq Epi: x / Non Sq Epi: 17 /HPF / Bacteria: Few /HPF        RADIOLOGY & ADDITIONAL STUDIES:    ACC: [id number] EXAM:  US OB TRANSVAGINAL   ORDERED BY: RAMEZ FERNANDEZ     PROCEDURE DATE:  10/28/2024          INTERPRETATION:  CLINICAL INFORMATION: Pregnancy. Evaluate ectopic.    LMP: 2024    Estimated Gestational Age by LMP: 6 weeks 0 days    COMPARISON: None available.    Endovaginal pelvic sonogram only.    FINDINGS:  Uterus: 9.9 x 6.0 x 3.3 cm. Heterogeneous thickened myometrium suggestive   of adenomyosis. Intramural fibroids measuring 1.1 cm and 1.5 cm.   Intrauterine gestational sac measuring 4 mm. No definite yolk sac or   fetal pole.    Right ovary: 2.7 cm x 2.1 cm x 3.0 cm. Corpus luteal cyst measuring 1.8   cm.  Left ovary: 1.6 cm x 1.5 cm x 1.6 cm. Within normal limits.    Fluid: None.    IMPRESSION:  Early intrauterine gestational sac. No definite yolk sac or fetal pole.   Recommend correlation with serial beta-hCG and ultrasound follow-up.    Uterine adenomyosis and fibroids.    --- End of Report ---            PETER TINAJERO MD; Attending Radiologist  This document has been electronically signed. Oct 28 2024  1:36PM

## 2024-10-28 NOTE — ED PROVIDER NOTE - PROGRESS NOTE DETAILS
Pt reassessed at bedside, feels well. Informed of workup in ED, reviewed lab and/or radiology results with patient. Informed pt of plan for discharge with instructions to follow up with OB/GYN. Pt expressed understanding of plan and agrees with plan for discharge. Strict return precautions discussed with patient in layman's terms, patient demonstrated understanding of return precautions. Marina Garcia PA-C OB consulted. Pt evaluated by GYN and cleared for dc to follow up with GYN tomorrow pt persistently tachycardic. spoke to pt and advised another liter of fluid. however, pt refuses and wishes to go home and does not want to continue the care. pt verbalized understanding the risk of leaving against AMA. pt provided the OBGYN referral list.

## 2024-10-28 NOTE — ED ADULT NURSE NOTE - OBJECTIVE STATEMENT
Received pt in intake room 9. pt A&OX3, ambulatory. pt with no PMH. Pt coming in from home for repeat BHCG. states was seen in another hospital last week after a trip and fall, was found to be pregnant but unable to receive ultrasound. states LMP 9/16. denies any pelvic pain, vaginal bleeding, n/v, chest pain, sob, headache, dizziness. respirations are equal and nonlabored, no respiratory distress noted. 20 gauge IV placed in the left AC, labs sent. pt medicated as per orders. stable, awaiting further plan.

## 2024-10-28 NOTE — ED PROVIDER NOTE - PATIENT PORTAL LINK FT
You can access the FollowMyHealth Patient Portal offered by [facility] by registering at the following website: [email or web address]. By joining [employer]’s FollowMyHealth portal, you will also be able to view your health information using other applications (apps) compatible with our system.

## 2024-10-28 NOTE — ED PROVIDER NOTE - NSFOLLOWUPINSTRUCTIONS_ED_ALL_ED_FT
Follow up with your OB/GYN in 1-2 days.   Return to Emergency Room with any worsening symptoms or no improvements.

## 2024-10-29 LAB
CULTURE RESULTS: SIGNIFICANT CHANGE UP
SPECIMEN SOURCE: SIGNIFICANT CHANGE UP

## 2024-10-30 ENCOUNTER — OUTPATIENT (OUTPATIENT)
Dept: OUTPATIENT SERVICES | Facility: HOSPITAL | Age: 39
LOS: 1 days | End: 2024-10-30

## 2024-10-30 ENCOUNTER — EMERGENCY (EMERGENCY)
Facility: HOSPITAL | Age: 39
LOS: 1 days | Discharge: HOME | End: 2024-10-30
Attending: EMERGENCY MEDICINE | Admitting: EMERGENCY MEDICINE
Payer: MEDICAID

## 2024-10-30 ENCOUNTER — NON-APPOINTMENT (OUTPATIENT)
Age: 39
End: 2024-10-30

## 2024-10-30 ENCOUNTER — RESULT REVIEW (OUTPATIENT)
Age: 39
End: 2024-10-30

## 2024-10-30 ENCOUNTER — APPOINTMENT (OUTPATIENT)
Dept: OBGYN | Facility: HOSPITAL | Age: 39
End: 2024-10-30
Payer: COMMERCIAL

## 2024-10-30 VITALS
HEART RATE: 88 BPM | TEMPERATURE: 98 F | HEIGHT: 61 IN | SYSTOLIC BLOOD PRESSURE: 110 MMHG | WEIGHT: 121.25 LBS | RESPIRATION RATE: 18 BRPM | OXYGEN SATURATION: 98 % | DIASTOLIC BLOOD PRESSURE: 75 MMHG

## 2024-10-30 VITALS
HEIGHT: 61 IN | SYSTOLIC BLOOD PRESSURE: 117 MMHG | TEMPERATURE: 98.2 F | HEART RATE: 121 BPM | BODY MASS INDEX: 23.6 KG/M2 | WEIGHT: 125 LBS | DIASTOLIC BLOOD PRESSURE: 76 MMHG

## 2024-10-30 VITALS
HEART RATE: 100 BPM | OXYGEN SATURATION: 100 % | RESPIRATION RATE: 18 BRPM | TEMPERATURE: 99 F | DIASTOLIC BLOOD PRESSURE: 75 MMHG | SYSTOLIC BLOOD PRESSURE: 111 MMHG

## 2024-10-30 DIAGNOSIS — O36.80X0 PREGNANCY WITH INCONCLUSIVE FETAL VIABILITY, NOT APPLICABLE OR UNSPECIFIED: ICD-10-CM

## 2024-10-30 DIAGNOSIS — Z00.00 ENCOUNTER FOR GENERAL ADULT MEDICAL EXAMINATION W/OUT ABNORMAL FINDINGS: ICD-10-CM

## 2024-10-30 LAB
ALBUMIN SERPL ELPH-MCNC: 4.5 G/DL — SIGNIFICANT CHANGE UP (ref 3.3–5)
ALP SERPL-CCNC: 73 U/L — SIGNIFICANT CHANGE UP (ref 40–120)
ALT FLD-CCNC: 15 U/L — SIGNIFICANT CHANGE UP (ref 4–33)
ANION GAP SERPL CALC-SCNC: 12 MMOL/L — SIGNIFICANT CHANGE UP (ref 7–14)
APPEARANCE UR: ABNORMAL
AST SERPL-CCNC: 16 U/L — SIGNIFICANT CHANGE UP (ref 4–32)
BACTERIA # UR AUTO: ABNORMAL /HPF
BASOPHILS # BLD AUTO: 0.02 K/UL — SIGNIFICANT CHANGE UP (ref 0–0.2)
BASOPHILS NFR BLD AUTO: 0.2 % — SIGNIFICANT CHANGE UP (ref 0–2)
BILIRUB SERPL-MCNC: <0.2 MG/DL — SIGNIFICANT CHANGE UP (ref 0.2–1.2)
BILIRUB UR-MCNC: NEGATIVE — SIGNIFICANT CHANGE UP
BLD GP AB SCN SERPL QL: NEGATIVE — SIGNIFICANT CHANGE UP
BUN SERPL-MCNC: 9 MG/DL — SIGNIFICANT CHANGE UP (ref 7–23)
CALCIUM SERPL-MCNC: 9.6 MG/DL — SIGNIFICANT CHANGE UP (ref 8.4–10.5)
CAST: 1 /LPF — SIGNIFICANT CHANGE UP (ref 0–4)
CHLORIDE SERPL-SCNC: 105 MMOL/L — SIGNIFICANT CHANGE UP (ref 98–107)
CO2 SERPL-SCNC: 21 MMOL/L — LOW (ref 22–31)
COLOR SPEC: YELLOW — SIGNIFICANT CHANGE UP
CREAT SERPL-MCNC: 0.58 MG/DL — SIGNIFICANT CHANGE UP (ref 0.5–1.3)
DIFF PNL FLD: NEGATIVE — SIGNIFICANT CHANGE UP
EGFR: 119 ML/MIN/1.73M2 — SIGNIFICANT CHANGE UP
EGFR: 119 ML/MIN/1.73M2 — SIGNIFICANT CHANGE UP
EOSINOPHIL # BLD AUTO: 0.09 K/UL — SIGNIFICANT CHANGE UP (ref 0–0.5)
EOSINOPHIL NFR BLD AUTO: 0.7 % — SIGNIFICANT CHANGE UP (ref 0–6)
GLUCOSE SERPL-MCNC: 86 MG/DL — SIGNIFICANT CHANGE UP (ref 70–99)
GLUCOSE UR QL: NEGATIVE MG/DL — SIGNIFICANT CHANGE UP
HCG SERPL-ACNC: 5222 MIU/ML — SIGNIFICANT CHANGE UP
HCG SERPL-ACNC: 5297 MIU/ML — SIGNIFICANT CHANGE UP
HCT VFR BLD CALC: 42.7 % — SIGNIFICANT CHANGE UP (ref 34.5–45)
HGB BLD-MCNC: 14 G/DL — SIGNIFICANT CHANGE UP (ref 11.5–15.5)
IANC: 9.38 K/UL — HIGH (ref 1.8–7.4)
IMM GRANULOCYTES NFR BLD AUTO: 0.4 % — SIGNIFICANT CHANGE UP (ref 0–0.9)
KETONES UR-MCNC: 15 MG/DL
LEUKOCYTE ESTERASE UR-ACNC: ABNORMAL
LYMPHOCYTES # BLD AUTO: 2.99 K/UL — SIGNIFICANT CHANGE UP (ref 1–3.3)
LYMPHOCYTES # BLD AUTO: 22.5 % — SIGNIFICANT CHANGE UP (ref 13–44)
MCHC RBC-ENTMCNC: 29 PG — SIGNIFICANT CHANGE UP (ref 27–34)
MCHC RBC-ENTMCNC: 32.8 G/DL — SIGNIFICANT CHANGE UP (ref 32–36)
MCV RBC AUTO: 88.4 FL — SIGNIFICANT CHANGE UP (ref 80–100)
MONOCYTES # BLD AUTO: 0.77 K/UL — SIGNIFICANT CHANGE UP (ref 0–0.9)
MONOCYTES NFR BLD AUTO: 5.8 % — SIGNIFICANT CHANGE UP (ref 2–14)
NEUTROPHILS # BLD AUTO: 9.38 K/UL — HIGH (ref 1.8–7.4)
NEUTROPHILS NFR BLD AUTO: 70.4 % — SIGNIFICANT CHANGE UP (ref 43–77)
NITRITE UR-MCNC: NEGATIVE — SIGNIFICANT CHANGE UP
NRBC # BLD AUTO: 0 K/UL — SIGNIFICANT CHANGE UP (ref 0–0)
NRBC # BLD: 0 /100 WBCS — SIGNIFICANT CHANGE UP (ref 0–0)
NRBC # FLD: 0 K/UL — SIGNIFICANT CHANGE UP (ref 0–0)
NRBC BLD-RTO: 0 /100 WBCS — SIGNIFICANT CHANGE UP (ref 0–0)
PH UR: 6 — SIGNIFICANT CHANGE UP (ref 5–8)
PLATELET # BLD AUTO: 342 K/UL — SIGNIFICANT CHANGE UP (ref 150–400)
POTASSIUM SERPL-MCNC: 4.4 MMOL/L — SIGNIFICANT CHANGE UP (ref 3.5–5.3)
POTASSIUM SERPL-SCNC: 4.4 MMOL/L — SIGNIFICANT CHANGE UP (ref 3.5–5.3)
PROT SERPL-MCNC: 7.9 G/DL — SIGNIFICANT CHANGE UP (ref 6–8.3)
PROT UR-MCNC: NEGATIVE MG/DL — SIGNIFICANT CHANGE UP
RBC # BLD: 4.83 M/UL — SIGNIFICANT CHANGE UP (ref 3.8–5.2)
RBC # FLD: 12.9 % — SIGNIFICANT CHANGE UP (ref 10.3–14.5)
RBC CASTS # UR COMP ASSIST: 4 /HPF — SIGNIFICANT CHANGE UP (ref 0–4)
REVIEW: SIGNIFICANT CHANGE UP
RH IG SCN BLD-IMP: POSITIVE — SIGNIFICANT CHANGE UP
SODIUM SERPL-SCNC: 138 MMOL/L — SIGNIFICANT CHANGE UP (ref 135–145)
SP GR SPEC: 1.02 — SIGNIFICANT CHANGE UP (ref 1–1.03)
SQUAMOUS # UR AUTO: 14 /HPF — HIGH (ref 0–5)
UROBILINOGEN FLD QL: 0.2 MG/DL — SIGNIFICANT CHANGE UP (ref 0.2–1)
WBC # BLD: 13.3 K/UL — HIGH (ref 3.8–10.5)
WBC # FLD AUTO: 13.3 K/UL — HIGH (ref 3.8–10.5)
WBC UR QL: 7 /HPF — HIGH (ref 0–5)

## 2024-10-30 PROCEDURE — 76817 TRANSVAGINAL US OBSTETRIC: CPT | Mod: 26

## 2024-10-30 PROCEDURE — 99213 OFFICE O/P EST LOW 20 MIN: CPT | Mod: GE

## 2024-10-30 PROCEDURE — 99285 EMERGENCY DEPT VISIT HI MDM: CPT

## 2024-10-31 DIAGNOSIS — O36.80X0 PREGNANCY WITH INCONCLUSIVE FETAL VIABILITY, NOT APPLICABLE OR UNSPECIFIED: ICD-10-CM

## 2024-11-01 LAB
CULTURE RESULTS: SIGNIFICANT CHANGE UP
SPECIMEN SOURCE: SIGNIFICANT CHANGE UP

## 2024-11-02 ENCOUNTER — EMERGENCY (EMERGENCY)
Facility: HOSPITAL | Age: 39
LOS: 1 days | Discharge: HOME | End: 2024-11-02
Attending: STUDENT IN AN ORGANIZED HEALTH CARE EDUCATION/TRAINING PROGRAM | Admitting: STUDENT IN AN ORGANIZED HEALTH CARE EDUCATION/TRAINING PROGRAM
Payer: MEDICAID

## 2024-11-02 VITALS
RESPIRATION RATE: 17 BRPM | OXYGEN SATURATION: 100 % | HEART RATE: 102 BPM | DIASTOLIC BLOOD PRESSURE: 71 MMHG | SYSTOLIC BLOOD PRESSURE: 110 MMHG | TEMPERATURE: 98 F

## 2024-11-02 VITALS
RESPIRATION RATE: 16 BRPM | OXYGEN SATURATION: 99 % | TEMPERATURE: 98 F | HEIGHT: 61 IN | HEART RATE: 93 BPM | DIASTOLIC BLOOD PRESSURE: 72 MMHG | SYSTOLIC BLOOD PRESSURE: 106 MMHG | WEIGHT: 115.08 LBS

## 2024-11-02 LAB
ALBUMIN SERPL ELPH-MCNC: 4 G/DL — SIGNIFICANT CHANGE UP (ref 3.3–5)
ALP SERPL-CCNC: 70 U/L — SIGNIFICANT CHANGE UP (ref 40–120)
ALT FLD-CCNC: 22 U/L — SIGNIFICANT CHANGE UP (ref 4–33)
ANION GAP SERPL CALC-SCNC: 14 MMOL/L — SIGNIFICANT CHANGE UP (ref 7–14)
APPEARANCE UR: ABNORMAL
AST SERPL-CCNC: 24 U/L — SIGNIFICANT CHANGE UP (ref 4–32)
BACTERIA # UR AUTO: ABNORMAL /HPF
BASOPHILS # BLD AUTO: 0.02 K/UL — SIGNIFICANT CHANGE UP (ref 0–0.2)
BASOPHILS NFR BLD AUTO: 0.2 % — SIGNIFICANT CHANGE UP (ref 0–2)
BILIRUB SERPL-MCNC: 0.2 MG/DL — SIGNIFICANT CHANGE UP (ref 0.2–1.2)
BILIRUB UR-MCNC: NEGATIVE — SIGNIFICANT CHANGE UP
BUN SERPL-MCNC: 10 MG/DL — SIGNIFICANT CHANGE UP (ref 7–23)
CALCIUM SERPL-MCNC: 9 MG/DL — SIGNIFICANT CHANGE UP (ref 8.4–10.5)
CAST: 0 /LPF — SIGNIFICANT CHANGE UP (ref 0–4)
CHLORIDE SERPL-SCNC: 104 MMOL/L — SIGNIFICANT CHANGE UP (ref 98–107)
CO2 SERPL-SCNC: 20 MMOL/L — LOW (ref 22–31)
COD CRY URNS QL: PRESENT
COLOR SPEC: YELLOW — SIGNIFICANT CHANGE UP
CREAT SERPL-MCNC: 0.6 MG/DL — SIGNIFICANT CHANGE UP (ref 0.5–1.3)
DIFF PNL FLD: NEGATIVE — SIGNIFICANT CHANGE UP
EGFR: 118 ML/MIN/1.73M2 — SIGNIFICANT CHANGE UP
EOSINOPHIL # BLD AUTO: 0.06 K/UL — SIGNIFICANT CHANGE UP (ref 0–0.5)
EOSINOPHIL NFR BLD AUTO: 0.6 % — SIGNIFICANT CHANGE UP (ref 0–6)
GLUCOSE SERPL-MCNC: 116 MG/DL — HIGH (ref 70–99)
GLUCOSE UR QL: NEGATIVE MG/DL — SIGNIFICANT CHANGE UP
HCG SERPL-ACNC: 5622 MIU/ML — SIGNIFICANT CHANGE UP
HCT VFR BLD CALC: 39.2 % — SIGNIFICANT CHANGE UP (ref 34.5–45)
HGB BLD-MCNC: 12.8 G/DL — SIGNIFICANT CHANGE UP (ref 11.5–15.5)
IANC: 7.11 K/UL — SIGNIFICANT CHANGE UP (ref 1.8–7.4)
IMM GRANULOCYTES NFR BLD AUTO: 0.2 % — SIGNIFICANT CHANGE UP (ref 0–0.9)
KETONES UR-MCNC: ABNORMAL MG/DL
LEUKOCYTE ESTERASE UR-ACNC: ABNORMAL
LYMPHOCYTES # BLD AUTO: 1.65 K/UL — SIGNIFICANT CHANGE UP (ref 1–3.3)
LYMPHOCYTES # BLD AUTO: 17.4 % — SIGNIFICANT CHANGE UP (ref 13–44)
MCHC RBC-ENTMCNC: 28.9 PG — SIGNIFICANT CHANGE UP (ref 27–34)
MCHC RBC-ENTMCNC: 32.7 G/DL — SIGNIFICANT CHANGE UP (ref 32–36)
MCV RBC AUTO: 88.5 FL — SIGNIFICANT CHANGE UP (ref 80–100)
MONOCYTES # BLD AUTO: 0.61 K/UL — SIGNIFICANT CHANGE UP (ref 0–0.9)
MONOCYTES NFR BLD AUTO: 6.4 % — SIGNIFICANT CHANGE UP (ref 2–14)
NEUTROPHILS # BLD AUTO: 7.11 K/UL — SIGNIFICANT CHANGE UP (ref 1.8–7.4)
NEUTROPHILS NFR BLD AUTO: 75.2 % — SIGNIFICANT CHANGE UP (ref 43–77)
NITRITE UR-MCNC: NEGATIVE — SIGNIFICANT CHANGE UP
NRBC # BLD: 0 /100 WBCS — SIGNIFICANT CHANGE UP (ref 0–0)
NRBC # FLD: 0 K/UL — SIGNIFICANT CHANGE UP (ref 0–0)
PH UR: 6.5 — SIGNIFICANT CHANGE UP (ref 5–8)
PLATELET # BLD AUTO: 299 K/UL — SIGNIFICANT CHANGE UP (ref 150–400)
POTASSIUM SERPL-MCNC: 4 MMOL/L — SIGNIFICANT CHANGE UP (ref 3.5–5.3)
POTASSIUM SERPL-SCNC: 4 MMOL/L — SIGNIFICANT CHANGE UP (ref 3.5–5.3)
PROT SERPL-MCNC: 7.4 G/DL — SIGNIFICANT CHANGE UP (ref 6–8.3)
PROT UR-MCNC: 30 MG/DL
RBC # BLD: 4.43 M/UL — SIGNIFICANT CHANGE UP (ref 3.8–5.2)
RBC # FLD: 12.9 % — SIGNIFICANT CHANGE UP (ref 10.3–14.5)
RBC CASTS # UR COMP ASSIST: 2 /HPF — SIGNIFICANT CHANGE UP (ref 0–4)
REVIEW: SIGNIFICANT CHANGE UP
SODIUM SERPL-SCNC: 138 MMOL/L — SIGNIFICANT CHANGE UP (ref 135–145)
SP GR SPEC: 1.03 — SIGNIFICANT CHANGE UP (ref 1–1.03)
SQUAMOUS # UR AUTO: 35 /HPF — HIGH (ref 0–5)
UROBILINOGEN FLD QL: 1 MG/DL — SIGNIFICANT CHANGE UP (ref 0.2–1)
WBC # BLD: 9.47 K/UL — SIGNIFICANT CHANGE UP (ref 3.8–10.5)
WBC # FLD AUTO: 9.47 K/UL — SIGNIFICANT CHANGE UP (ref 3.8–10.5)
WBC UR QL: 54 /HPF — HIGH (ref 0–5)

## 2024-11-02 PROCEDURE — 99285 EMERGENCY DEPT VISIT HI MDM: CPT

## 2024-11-02 PROCEDURE — 76830 TRANSVAGINAL US NON-OB: CPT | Mod: 26

## 2024-11-02 NOTE — CONSULT NOTE ADULT - SUBJECTIVE AND OBJECTIVE BOX
RAVIN BOUDREAUX  39y  Female [id number]    HPI: 38yo  LMP  presenting for repeat bCHG and TVUS. Patient was last seen on 10/30.       [facility] trend: 7397 (10/28)->5297 (10/30)    Name of GYN Physician: Follows w/Dr. Elisabeth Georges, [facility]   OBHx: 33w  3#, otherwise uncomp  GynHx: fibroids, cysts, adenomyosis; endorses regular cycles w/heavy menses  PMH: as per GynHx  PSH: s/p appendectomy, s/p neck surgery  Meds: denies  Allx: NKDA  Social History:  Denies smoking use, drug use, alcohol use.        Vital Signs Last 24 Hrs  T(C): 36.8 (2024 10:46), Max: 36.8 (2024 10:46)  T(F): 98.2 (2024 10:46), Max: 98.2 (2024 10:46)  HR: 93 (2024 10:46) (93 - 93)  BP: 106/72 (2024 10:46) (106/72 - 106/72)  BP(mean): --  RR: 16 (2024 10:46) (16 - 16)  SpO2: 99% (2024 10:46) (99% - 99%)    Parameters below as of 2024 10:46  Patient On (Oxygen Delivery Method): room air        Physical Exam:   General: sitting comfortably in bed, NAD   HEENT: neck supple, full ROM  CV: RR S1S2 no m/r/g  Lungs: CTA b/l, good air flow b/l   Back: No CVA tenderness  Abd: Soft, non-tender, non-distended.  Bowel sounds present.    :  No bleeding on pad. External labia wnl. Bimanual exam with no cervical motion tenderness, uterus wnl, adnexa non palpable b/l.  Cervix closed vs. Cervix dilated *** cm   Speculum Exam: No active bleeding from os. Physiologic discharge.    Ext: non-tender b/l, no edema     Chaperoned by     LABS:                I&O's Detail          RADIOLOGY & ADDITIONAL STUDIES: RAVIN BOUDREAUX  39y  Female [id number]    HPI: 40yo  LMP  presenting for repeat bCHG and TVUS in the setting of PUL. Patient was last seen on 10/30. Patient denies vaginal bleeding or abdominal pain.     TVUS (): GS 0.7cm -YS/FP, no FF, corpus leutal cyst   TVUS (10/30): early GS in ut, no YS/FP, ov wnl, no FF  TVUS (10/28): early GS in ut, no YS/FP, ov wnl, no FF    bHCG trend: 7397 (10/28)->5297 (10/30)-> 5222(10/30)->5622()    Name of GYN Physician: Follows w/Dr. Elisabeth Georges, [facility]   OBHx: 33w  3#, otherwise uncomp  GynHx: fibroids, cysts, adenomyosis; endorses regular cycles w/heavy menses  PMH: as per GynHx  PSH: s/p appendectomy, s/p neck surgery  Meds: denies  Allx: NKDA  Social History:  Denies smoking use, drug use, alcohol use.        Vital Signs Last 24 Hrs  T(C): 36.8 (2024 10:46), Max: 36.8 (2024 10:46)  T(F): 98.2 (2024 10:46), Max: 98.2 (2024 10:46)  HR: 93 (2024 10:46) (93 - 93)  BP: 106/72 (2024 10:46) (106/72 - 106/72)  BP(mean): --  RR: 16 (2024 10:46) (16 - 16)  SpO2: 99% (2024 10:46) (99% - 99%)    Parameters below as of 2024 10:46  Patient On (Oxygen Delivery Method): room air        Physical Exam:   General: sitting comfortably in bed, NAD   Lungs: non labored breathing on RA  Back: No CVA tenderness  Abd: Soft, non-tender, non-distended.    :  No bleeding on pad.    Ext: non-tender b/l, no edema               RADIOLOGY & ADDITIONAL STUDIES:    < from: US Transvaginal (24 @ 14:31) >    ACC: [id number] EXAM:  US TRANSVAGINAL   ORDERED BY: OLIVIA CLEVELAND     *** ADDENDUM # 1 ***    Addendum to correct typographical error.    The report impression mentions a prior study of 10/3/2024; correction to   "lack of change from prior study of 10/30/2024".    --- End of Report ---    *** END OF ADDENDUM # 1 ***      PROCEDURE DATE:  2024          INTERPRETATION:  CLINICAL INFORMATION: 39 year old female for evaluation   of intrauterine pregnancy versus ectopic. Patient denies vaginal bleeding   and abdominal pain.    LMP: 2024    COMPARISON: Pelvic ultrasound 10/28/2024 and 10/30/2024    TECHNIQUE:  Endovaginal and transabdominal pelvic sonogram.    FINDINGS:  Uterus: Enlarged uterus with multiple fibroids. These include a left   uterine body subserosal fibroid measuring 1.8 x 2.0 x 1.5 cm and a right   anterior uterine body intramural fibroid measuring 2.1 x 1.5 x 2.3 cm.   Multiple additional smaller fibroids are present. The myometrium is   thickened and heterogeneous in echotexture with foci of shadowing,   compatible with coexisting adenomyosis.    Endometrium: The endometrium is poorly delineated on submitted views,   likely distorted by the multiple fibroids, measuring approximately 1.2   cm. Again visualized within the endometrium, there is an oval in shape   fluid-filled structure with echogenic rim which measures 0.5 x 0.3 x 1.0   cm. This may represent a small gestational with mean sac diameter of 0.7   cm. There are some internal echoes present within this structure;   however, a yolk sac or embryo are not visualized; therefore the findings   are not definitive. This previously measured  0.8 x 0.4 x 0.9 cm (mean   diameter 0.7cm) on the prior ultrasound of 10/30/2024 and 0.7 x 0.3 x 0.3   cm (mean sac diameter of 0.4 cm) on the prior study of 10/28/2024.    Right ovary: 3.2 cm x 1.6 cm x 3.1 cm. Corpus luteal cyst measuring 1.9 x   1.8 x 1.8 cm, previously 2.2 x 1.8 x 2.3 cm on 10/30/2024, and 1.8 x 1.6   x 1.8 cm on prior study of 10/28/2024.  Left ovary: 2.6 cm x 1.2 cm x 1.6 cm. Within normal limits.    Fluid: None.    IMPRESSION:  Oval in shape fluid collection in the uterus may represent a small   gestational sac with mean sac diameter 0.7 cm; however a yolk sac or   embryo are not identified; therefore, the findings are not definitive.   These findings are largely unchanged from 10/30/2024.    Given the lack of change from prior study of 10/3/2024, the findings are   concerning for possible early pregnancy failure. A normal early   intrauterine pregnancy is considered less likely, but not entirely   excluded. As a definitive intrauterine pregnancy is not documented, an   occult ectopic pregnancy is also not excluded. No adnexal masses are   identified on this exam; however, assessment is limited by the enlarged   uterus. Correlation with serial quantitative beta hCG levels is advised.   Additional close short-term follow-up ultrasound is also recommended.   Gynecologic consultation is also recommended for further evaluation and   management of these findings.    These findings were discussed with Dr.Masra Cleveland by Dr. Medrano on   2024 at 4:16 PM.      --- End of Report ---    ***Please see the addendum at the top of this report. It may contain   additional important information or changes.****      JUAN RAOMN MICHAEL MD; Resident Radiologist  This document has been electronically signed.  MARIELENA MEDRANO MD; Attending Radiologist  This document has been electronically signed. 2024  4:23PM  1st Addendum:MARIELENA MEDRANO MD; Attending Radiologist  The first addendum was electronically signed on: 2024  5:15PM.    < end of copied text >

## 2024-11-02 NOTE — ED PROVIDER NOTE - NSFOLLOWUPCLINICS_GEN_ALL_ED_FT
[facility]  OB/GYN & Surg  [address] [place] [id number]  Phone: [phone]  Fax:   Scheduled Appointment: 11/4/2024     [facility] Gynecology and Obstetrics  Gynceology/OB  [address] [place] [address]  Phone: [phone]  Fax:     [facility]  OB-GYN  [address] [place] [address]  Phone: [phone]  Fax:

## 2024-11-02 NOTE — ED ADULT TRIAGE NOTE - CHIEF COMPLAINT QUOTE
Pt , confirmed ectopic pregnant, 6 weeks pregnant, told to come to ED for beta-hcg blood test to see trending of hormone level.

## 2024-11-02 NOTE — ED ADULT NURSE NOTE - OBJECTIVE STATEMENT
Received pt in bed A and OX 3 in NAD endorses no complaints, sent  in due to non viable pregnancy and possible ectopic pregnancy, pt denies abd pain, fever, chills, vagina bleeding, abd assessment deferred till OB eval. IV initiated 20 G left AC labs drawn and sent.

## 2024-11-02 NOTE — CONSULT NOTE ADULT - ASSESSMENT
***Incomplete note****    - Patient TBS, pending TVUS and repeat bHCG A/P: Pt is a 40yo  LMP  (GA 6w0d) presenting w/positive HCG 10/24 from outside hospital and 10/26 TVUS showing no IUP. Patient was sent in from clinic for further evaluation due to downtrending bHCG (7397 (10/28)->5297 (10/30)->5222(10/30)->5622(), TVUS showing gestational sac without definite yolk sac or fetal pole. Pt with stable VS and benign abdominal exam. Patient with PUL. Cannot rule out ectopic pregnancy vs failed pregnancy vs IUP at this time.     Recs:  - Pt stable from GYN perspective  - Patient to return to ED or [facility] for repeat bHCG and TVUS on 48 hrs.   - Return precautions: sharp/sudden unilateral pain, being unable to stand/walk, not relieved w/Tylenol; heavy vaginal bleeding leading to saturation of pads or symptoms of anemia (SOB, etc.).    D/w Dr. Young Reyes, PGY2     [facility]  [address] [place] [address] [place] [address]  [phone]

## 2024-11-02 NOTE — ED ADULT NURSE NOTE - CCCP TRG CHIEF CMPLNT
Called patient with instructions. Will place lab order in epic.     Lora Merlin, APRN - CNP   3/9/2022  7:46 AM EST         Decrease potassium supplement to 1/2 tablet or 10 mEq daily and recheck K+ after one week blood draw

## 2024-11-02 NOTE — ED ADULT NURSE NOTE - NSFALLUNIVINTERV_ED_ALL_ED
Bed/Stretcher in lowest position, wheels locked, appropriate side rails in place/Call bell, personal items and telephone in reach/Instruct patient to call for assistance before getting out of bed/chair/stretcher/Non-slip footwear applied when patient is off stretcher/[place] to call system/Physically safe environment - no spills, clutter or unnecessary equipment/Purposeful proactive rounding/Room/bathroom lighting operational, light cord in reach

## 2024-11-02 NOTE — ED PROVIDER NOTE - PROGRESS NOTE DETAILS
Meredith Gaytan MD (Attending MD): Informed by RN that patient and  continually becoming agitated due to waiting for US. RN called US and patient taken to US. On arrival from US, patient states that she wants to go home. I informed patient that the ultrasound read is not complete and the concern is still an ectopic vs. pseudosac. I relayed that if she chooses to leave AMA it was would be against medical advice and the risks include an ectopic that may rupture, severe pain, and death. This was relayed to her  as well. Patient is agitated that she has required multiple USs and I informed her that this is the normal evaluation of PUL. Meredith Gaytan MD (Attending MD): Informed by RN that patient and  continually becoming agitated due to waiting for US. RN called US and patient taken to US. On arrival from US, patient states that she wants to go home. I informed patient that the ultrasound read is not complete and the concern is still an ectopic vs. pseudosac. I relayed that if she chooses to leave AMA it was would be against medical advice and the risks include an ectopic that may rupture, severe pain, and death. This was relayed to her  as well. Patient is agitated that she has required multiple USs and I informed her that this is the normal evaluation of PUL. Amendable to staying. Meredith Gaytan MD (Attending MD): Received call from US attg. The fluid sac is unchanged. this is her 4th US, unlikely that this will develop onto a normal pregnancy. Cannot r/o occult ectopic. The patient wishes to be discharged against medical advice. I have assessed the patient's mental status and  the patient has capacity to make this decision. I have explained the risks of leaving without full treatment, including severe disability and death, which the patient understands and is willing to accept. I have answered all of the patient's questions. I reiterated my medical opinion and advised the patient to return at any time. We discussed the further workup outside of the current visit and return precautions. Meredith Gaytan MD (Attending MD): I spoke to OB in room with patient, they can see patient now. Patient now amendable to staying. Meredith Gaytan MD (Attending MD): Imaging was discussed with OB team. Still cannot definitely say what type of pregnancy this is. Rec 48 hour return for beta and TVUS. Patient can follow at OB clinic if not ED.

## 2024-11-02 NOTE — ED PROVIDER NOTE - CLINICAL SUMMARY MEDICAL DECISION MAKING FREE TEXT BOX
Meredith Gaytan MD (Attending MD): 38 y/o  LMP   who presents to the ED with repeat HCG check. Patient was seen on OCT 30 for f/u for concern for ectopic vs. IUP. TVUS showing fluid collection in uterus w/o yolk sac or fetal pole, may be early IUP vs ectopic. Patient was to return on oct 31 but came today. No fever, chills, nausea, vomiting, vaginal bleeding or discharge or abd pain.     CONSTITUTIONAL: No fevers, no chills, no lightheadedness, no dizziness  EYES: no visual changes, no eye pain  EARS: no ear drainage, no ear pain, no change in hearing  NOSE: no nasal congestion  MOUTH/THROAT: no sore throat  CV: No chest pain, no palpitations  RESP: No SOB, no cough  GI: No n/v/d, no abd pain  : no dysuria, no hematuria, no flank pain  MSK: no back pain, no extremity pain  SKIN: no rashes  NEURO: no headache, no focal weakness, no decreased sensation/parasthesias   PSYCHIATRIC: no known mental health issues    General: Alert and Orientated x 3. No apparent distress.  Head: Normocephalic and atraumatic.  Eyes: PERRLA with EOMI.  Neck: Supple. Trachea midline.   Cardiac: Normal S1 and S2 w/ RRR. No murmurs appreciated. No JVD appreciated.  Pulmonary: CTA bilaterally. No increased WOB. No wheezes or crackles.  Abdominal: Soft, non-tender. (+) bowel sounds appreciated in all 4 quadrants. No hepatosplenomegaly.   Neurologic: No focal sensory or motor deficits.  Musculoskeletal: Strength appropriate in all 4 extremities for age with no limited ROM.  Skin: Color appropriate for race. Intact, warm, and well-perfused.  Psychiatric: Appropriate mood and affect. No apparent risk to self or others.     MDM: 38 y/o  LMP  HDS p/w w/ concern for IUP vs ectopic. Will repeat labs. T&s in chart x2 w/ Rh +. Will discuss w/ gyn for rpt tvus given unclear fluid collection on previous us.     *The above represents an initial assessment/impression. Please refer to progress notes for potential changes in patient clinical course*

## 2024-11-02 NOTE — ED PROVIDER NOTE - PATIENT PORTAL LINK FT
You can access the FollowMyHealth Patient Portal offered by [facility] by registering at the following website: [email or web address]. By joining [employer]’s FollowMyHealth portal, you will also be able to view your health information using other applications (apps) compatible with our system. You can access the FollowMyHealth Patient Portal offered by [facility] by registering at the following website: [email or web address]. By joining [employer]’s FollowMyHealth portal, you will also be able to view your health information using other applications (apps) compatible with our system.

## 2024-11-02 NOTE — ED PROVIDER NOTE - NSFOLLOWUPINSTRUCTIONS_ED_ALL_ED_FT
You were seen in the Emergency Department for concern for ectopic pregnancy. Lab and imaging results, if performed, were discussed with you along with your discharge diagnosis.    IT WAS RECOMMENDED THAT YOU STAY IN THE ED UNTIL THE FINAL RESULTS OF YOUR ULTRASOUND WERE COMPLETE AND THE OBSTETRICS TEAM SPEAKS TO YOU. GIVEN THE CONCERN FOR AN ECTOPIC PREGNANCY, THE RISKS OF LEAVING WERE DISCUSSED WITH YOU IN DETAIL.     YOU MAY RETURN TO THE ED AT ANY TIME TO RECEIVE CARE.     Follow with your OB ASAP. Follow up with your doctor in 1 week - bring copies of your results if you were given. If you do not have a primary doctor, please call [phone] to find one convenient for you.    Continue all prescribed medications.     To control your pain at home, you should take Ibuprofen 400 mg along with Tylenol 650mg-1000mg every 6 to 8 hours. Limit your maximum daily Tylenol from all sources to 4000mg.     Return to ED for any new or worsening symptoms including but not limited to: development of severe abdominal pain or bleeding, chest pain, shortness of breath, fever, vomiting, focal numbness, weakness or tingling, any severe CP, headache, abdominal pain, back pain.      Rest and keep yourself hydrated with fluids. You were seen in the Emergency Department for concern for ectopic pregnancy. Lab and imaging results, if performed, were discussed with you along with your discharge diagnosis.    IT IS RECOMMENDED THAT YOU RETURN TO THE ED OR OB CLINIC IN 48 HOURS TO REPEAT TESTING INCLUDING LAB WORK AND ULTRASOUND.       Follow up with your doctor in 1 week - bring copies of your results if you were given. If you do not have a primary doctor, please call [phone] to find one convenient for you.    Continue all prescribed medications.     To control your pain at home, you should take Ibuprofen 400 mg along with Tylenol 650mg-1000mg every 6 to 8 hours. Limit your maximum daily Tylenol from all sources to 4000mg.     Return to ED for any new or worsening symptoms including but not limited to: development of severe abdominal pain or bleeding, chest pain, shortness of breath, fever, vomiting, focal numbness, weakness or tingling, any severe CP, headache, abdominal pain, back pain.      Rest and keep yourself hydrated with fluids.

## 2024-11-03 LAB
CULTURE RESULTS: SIGNIFICANT CHANGE UP
SPECIMEN SOURCE: SIGNIFICANT CHANGE UP

## 2024-11-04 ENCOUNTER — APPOINTMENT (OUTPATIENT)
Dept: OBGYN | Facility: HOSPITAL | Age: 39
End: 2024-11-04

## 2024-11-04 NOTE — CHART NOTE - NSCHARTNOTEFT_GEN_A_CORE
R2 bHCG Note    Called pt regarding following up in ED to trend bHCG. Pt states that she will come to the ED later today. Return precautions reviewed. All questions answered to apparent satisfaction.    Pily Spangler PGY2

## 2024-11-05 ENCOUNTER — RESULT REVIEW (OUTPATIENT)
Age: 39
End: 2024-11-05

## 2024-11-09 ENCOUNTER — EMERGENCY (EMERGENCY)
Facility: HOSPITAL | Age: 39
LOS: 1 days | Discharge: HOME | End: 2024-11-09
Admitting: EMERGENCY MEDICINE
Payer: MEDICAID

## 2024-11-09 VITALS
WEIGHT: 125 LBS | SYSTOLIC BLOOD PRESSURE: 112 MMHG | TEMPERATURE: 98 F | DIASTOLIC BLOOD PRESSURE: 74 MMHG | HEART RATE: 106 BPM | RESPIRATION RATE: 18 BRPM | HEIGHT: 61 IN | OXYGEN SATURATION: 97 %

## 2024-11-09 VITALS
OXYGEN SATURATION: 100 % | SYSTOLIC BLOOD PRESSURE: 107 MMHG | TEMPERATURE: 99 F | DIASTOLIC BLOOD PRESSURE: 65 MMHG | HEART RATE: 102 BPM | RESPIRATION RATE: 17 BRPM

## 2024-11-09 LAB — HCG SERPL-ACNC: 6901 MIU/ML — SIGNIFICANT CHANGE UP

## 2024-11-09 PROCEDURE — 76817 TRANSVAGINAL US OBSTETRIC: CPT | Mod: 26

## 2024-11-09 PROCEDURE — 99283 EMERGENCY DEPT VISIT LOW MDM: CPT

## 2024-11-09 PROCEDURE — 99284 EMERGENCY DEPT VISIT MOD MDM: CPT

## 2024-11-09 NOTE — ED PROVIDER NOTE - CLINICAL SUMMARY MEDICAL DECISION MAKING FREE TEXT BOX
39-year-old female  well known to us over the past weeks since being evaluated for pregnancy of unknown location, presents to the ED for follow-up repeat beta-hCG after vacuum aspiration pathology results reported no features of part of products of conception.  Patient reports otherwise feeling well, no abdominal pain, no vaginal bleeding, no nausea vomiting, dizziness, fevers, chills, or any other associated symptoms.  Patient with stable hemodynamics, well-appearing, in no acute distress.  Abdomen soft nontender.  Impression pregnancy of unknown location status post vacuum aspiration, concern for ectopic pregnancy giving pathology reports shows no evidence of POC.  Plan for repeat beta-hCG and transvaginal ultrasound, Gyn consult. 39-year-old female  well known to gyn service over the past weeks since being evaluated for pregnancy of unknown location, presents to the ED for follow-up repeat beta-hCG after vacuum aspiration pathology results reported no features of part of products of conception.  Patient reports otherwise feeling well, no abdominal pain, no vaginal bleeding, no nausea vomiting, dizziness, fevers, chills, or any other associated symptoms.  Patient with stable hemodynamics, well-appearing, in no acute distress.  Abdomen soft nontender.  Impression pregnancy of unknown location status post vacuum aspiration, concern for ectopic pregnancy giving pathology reports shows no evidence of POC.  Plan for repeat beta-hCG and transvaginal ultrasound, Gyn consult. 39-year-old female  well known to gyn service over the past weeks since being evaluated for pregnancy of unknown location, presents to the ED today for follow-up repeat beta-hCG after manual vacuum aspiration pathology results reported no features of products of conception. Patient reports otherwise feeling well, no abdominal pain, no vaginal bleeding, no nausea vomiting, dizziness, fevers, chills, or any other associated symptoms.  Patient with stable hemodynamics, well-appearing, in no acute distress.  Abdomen soft nontender.  Impression: pregnancy of unknown location status post manual vacuum aspiration by gyn on , concern for ectopic pregnancy giving pathology reports shows no evidence of products of conception.  Plan for repeat beta-hCG and transvaginal ultrasound, Gyn reconsulted.

## 2024-11-09 NOTE — ED PROVIDER NOTE - PROGRESS NOTE DETAILS
FILI Singh: [facility] trending up with TVUS showing an IUP with no fetal heart rate. Pt was seen by GYN team (Attg along with the resident). Pt is cleared for discharge home. She will follow up with her OB/GYN as scheduled next week. Strict return precautions,

## 2024-11-09 NOTE — ED PROVIDER NOTE - NSFOLLOWUPINSTRUCTIONS_ED_ALL_ED_FT
Please follow up with your OB/GYN as scheduled next week.    Follow up with your PMD within 1-2 days or you can call our clinic at [phone] for an appointment  Take all of your other medications as previously prescribed.  Worsening, continued or ANY new concerning symptoms return to the Emergency Department.    Contact a health care provider if:  You have a fever or chills.  There's bad-smelling fluid coming from your vagina.  You have more bleeding instead of less.  More tissue or blood clots come out of your vagina than you were told to expect.  You become light-headed or weak.  Get help right away if:  Heavy bleeding soaks through 2 large pads an hour for more than 2 hours.  You faint.  You feel sad all the time.  You think about hurting yourself.

## 2024-11-09 NOTE — ED ADULT TRIAGE NOTE - CHIEF COMPLAINT QUOTE
Pt presents to ED ambulatory from home for repeat lab work. Pt had vaccuum aspiration 6 days ago, and was advised to return for repeat lab work. Pt denies vaginal bleeding.

## 2024-11-09 NOTE — CONSULT NOTE ADULT - ATTENDING COMMENTS
Attending Note   Agree with above   Pt is unknown to me prior to today   pt reports having no pain or bleeding   MVA performed on 11/4 that showed no villi   today TVUS shows + yolk sac, small fetal pole   Counselled pt that pregnancy is intrauterine   pregnancy is highly desired   recommend repeat sono in 7-10 days   precautions reivewed
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
## 2024-11-09 NOTE — ED PROVIDER NOTE - PATIENT PORTAL LINK FT
You can access the FollowMyHealth Patient Portal offered by [facility] by registering at the following website: [email or web address]. By joining [employer]’s FollowMyHealth portal, you will also be able to view your health information using other applications (apps) compatible with our system.
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
## 2024-11-09 NOTE — ED PROVIDER NOTE - CARE PLAN
1 Principal Discharge DX:	Pregnancy of unknown anatomic location   Principal Discharge DX:	Early stage of pregnancy

## 2024-11-09 NOTE — ED ADULT NURSE NOTE - HIV OFFER
Apply cream to rectal area (around the outer edges of rectum as well as JUST inside the rectum) three times daily for two weeks. Use a squeeze bottle or bidet to clear stool after defecation. Stay well hydrated to help with softer stools. Follow up with Dr. Ry Brothers if symptoms persist longer than 2 weeks. Previously Declined (within the last year)

## 2024-11-09 NOTE — ED PROVIDER NOTE - OBJECTIVE STATEMENT
39-year-old female  well known to us over the past weeks since being evaluated for pregnancy of unknown location, presents to the ED for follow-up repeat beta-hCG after vacuum aspiration pathology results reported no features of part of products of conception.  Patient reports otherwise feeling well, no abdominal pain, no vaginal bleeding, no nausea vomiting, dizziness, fevers, chills, or any other associated symptoms. 39-year-old female  well known to gyn service over the past weeks since being evaluated for pregnancy of unknown location, presents to the ED for follow-up repeat beta-hCG after vacuum aspiration pathology results reported no features of part of products of conception.  Patient reports otherwise feeling well, no abdominal pain, no vaginal bleeding, no nausea vomiting, dizziness, fevers, chills, or any other associated symptoms. 39-year-old female  well known to gyn service over the past weeks since being evaluated for pregnancy of unknown location, presents to the ED today for follow-up repeat beta-hCG after manual vacuum aspiration pathology results reported no features of products of conception. Patient reports otherwise feeling well, no abdominal pain, no vaginal bleeding, no nausea/vomiting, dizziness, fevers, chills, or any other associated symptoms.
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
## 2024-11-14 ENCOUNTER — EMERGENCY (EMERGENCY)
Facility: HOSPITAL | Age: 39
LOS: 1 days | Discharge: HOME | End: 2024-11-14
Admitting: STUDENT IN AN ORGANIZED HEALTH CARE EDUCATION/TRAINING PROGRAM
Payer: MEDICAID

## 2024-11-14 VITALS
DIASTOLIC BLOOD PRESSURE: 79 MMHG | TEMPERATURE: 98 F | RESPIRATION RATE: 18 BRPM | SYSTOLIC BLOOD PRESSURE: 118 MMHG | HEART RATE: 93 BPM | OXYGEN SATURATION: 100 %

## 2024-11-14 VITALS
RESPIRATION RATE: 18 BRPM | HEART RATE: 105 BPM | OXYGEN SATURATION: 99 % | DIASTOLIC BLOOD PRESSURE: 72 MMHG | SYSTOLIC BLOOD PRESSURE: 108 MMHG | TEMPERATURE: 98 F | HEIGHT: 61 IN | WEIGHT: 125 LBS

## 2024-11-14 PROBLEM — Z78.9 OTHER SPECIFIED HEALTH STATUS: Chronic | Status: ACTIVE | Noted: 2024-11-09

## 2024-11-14 LAB
ALBUMIN SERPL ELPH-MCNC: 4 G/DL — SIGNIFICANT CHANGE UP (ref 3.3–5)
ALP SERPL-CCNC: 66 U/L — SIGNIFICANT CHANGE UP (ref 40–120)
ALT FLD-CCNC: 20 U/L — SIGNIFICANT CHANGE UP (ref 4–33)
ANION GAP SERPL CALC-SCNC: 12 MMOL/L — SIGNIFICANT CHANGE UP (ref 7–14)
APPEARANCE UR: CLEAR — SIGNIFICANT CHANGE UP
APTT BLD: 32.1 SEC — SIGNIFICANT CHANGE UP (ref 24.5–35.6)
AST SERPL-CCNC: 13 U/L — SIGNIFICANT CHANGE UP (ref 4–32)
BACTERIA # UR AUTO: NEGATIVE /HPF — SIGNIFICANT CHANGE UP
BASOPHILS # BLD AUTO: 0.02 K/UL — SIGNIFICANT CHANGE UP (ref 0–0.2)
BASOPHILS NFR BLD AUTO: 0.2 % — SIGNIFICANT CHANGE UP (ref 0–2)
BILIRUB SERPL-MCNC: 0.2 MG/DL — SIGNIFICANT CHANGE UP (ref 0.2–1.2)
BILIRUB UR-MCNC: NEGATIVE — SIGNIFICANT CHANGE UP
BUN SERPL-MCNC: 8 MG/DL — SIGNIFICANT CHANGE UP (ref 7–23)
CALCIUM SERPL-MCNC: 9.3 MG/DL — SIGNIFICANT CHANGE UP (ref 8.4–10.5)
CAST: 0 /LPF — SIGNIFICANT CHANGE UP (ref 0–4)
CHLORIDE SERPL-SCNC: 105 MMOL/L — SIGNIFICANT CHANGE UP (ref 98–107)
CO2 SERPL-SCNC: 23 MMOL/L — SIGNIFICANT CHANGE UP (ref 22–31)
COLOR SPEC: ABNORMAL
CREAT SERPL-MCNC: 0.56 MG/DL — SIGNIFICANT CHANGE UP (ref 0.5–1.3)
DIFF PNL FLD: ABNORMAL
EGFR: 120 ML/MIN/1.73M2 — SIGNIFICANT CHANGE UP
EOSINOPHIL # BLD AUTO: 0.06 K/UL — SIGNIFICANT CHANGE UP (ref 0–0.5)
EOSINOPHIL NFR BLD AUTO: 0.7 % — SIGNIFICANT CHANGE UP (ref 0–6)
GLUCOSE SERPL-MCNC: 117 MG/DL — HIGH (ref 70–99)
GLUCOSE UR QL: NEGATIVE MG/DL — SIGNIFICANT CHANGE UP
HCG SERPL-ACNC: 420.1 MIU/ML — SIGNIFICANT CHANGE UP
HCT VFR BLD CALC: 36.3 % — SIGNIFICANT CHANGE UP (ref 34.5–45)
HGB BLD-MCNC: 11.8 G/DL — SIGNIFICANT CHANGE UP (ref 11.5–15.5)
IANC: 5.77 K/UL — SIGNIFICANT CHANGE UP (ref 1.8–7.4)
IMM GRANULOCYTES NFR BLD AUTO: 0.2 % — SIGNIFICANT CHANGE UP (ref 0–0.9)
INR BLD: 1.02 RATIO — SIGNIFICANT CHANGE UP (ref 0.85–1.16)
KETONES UR-MCNC: NEGATIVE MG/DL — SIGNIFICANT CHANGE UP
LEUKOCYTE ESTERASE UR-ACNC: NEGATIVE — SIGNIFICANT CHANGE UP
LYMPHOCYTES # BLD AUTO: 2.14 K/UL — SIGNIFICANT CHANGE UP (ref 1–3.3)
LYMPHOCYTES # BLD AUTO: 25.1 % — SIGNIFICANT CHANGE UP (ref 13–44)
MCHC RBC-ENTMCNC: 29.1 PG — SIGNIFICANT CHANGE UP (ref 27–34)
MCHC RBC-ENTMCNC: 32.5 G/DL — SIGNIFICANT CHANGE UP (ref 32–36)
MCV RBC AUTO: 89.4 FL — SIGNIFICANT CHANGE UP (ref 80–100)
MONOCYTES # BLD AUTO: 0.53 K/UL — SIGNIFICANT CHANGE UP (ref 0–0.9)
MONOCYTES NFR BLD AUTO: 6.2 % — SIGNIFICANT CHANGE UP (ref 2–14)
NEUTROPHILS # BLD AUTO: 5.77 K/UL — SIGNIFICANT CHANGE UP (ref 1.8–7.4)
NEUTROPHILS NFR BLD AUTO: 67.6 % — SIGNIFICANT CHANGE UP (ref 43–77)
NITRITE UR-MCNC: NEGATIVE — SIGNIFICANT CHANGE UP
NRBC # BLD: 0 /100 WBCS — SIGNIFICANT CHANGE UP (ref 0–0)
NRBC # FLD: 0 K/UL — SIGNIFICANT CHANGE UP (ref 0–0)
PH UR: 6 — SIGNIFICANT CHANGE UP (ref 5–8)
PLATELET # BLD AUTO: 288 K/UL — SIGNIFICANT CHANGE UP (ref 150–400)
POTASSIUM SERPL-MCNC: 4.2 MMOL/L — SIGNIFICANT CHANGE UP (ref 3.5–5.3)
POTASSIUM SERPL-SCNC: 4.2 MMOL/L — SIGNIFICANT CHANGE UP (ref 3.5–5.3)
PROT SERPL-MCNC: 6.9 G/DL — SIGNIFICANT CHANGE UP (ref 6–8.3)
PROT UR-MCNC: SIGNIFICANT CHANGE UP MG/DL
PROTHROM AB SERPL-ACNC: 11.8 SEC — SIGNIFICANT CHANGE UP (ref 9.9–13.4)
RBC # BLD: 4.06 M/UL — SIGNIFICANT CHANGE UP (ref 3.8–5.2)
RBC # FLD: 13.3 % — SIGNIFICANT CHANGE UP (ref 10.3–14.5)
RBC CASTS # UR COMP ASSIST: 50 /HPF — HIGH (ref 0–4)
SODIUM SERPL-SCNC: 140 MMOL/L — SIGNIFICANT CHANGE UP (ref 135–145)
SP GR SPEC: 1.01 — SIGNIFICANT CHANGE UP (ref 1–1.03)
SQUAMOUS # UR AUTO: 1 /HPF — SIGNIFICANT CHANGE UP (ref 0–5)
UROBILINOGEN FLD QL: 0.2 MG/DL — SIGNIFICANT CHANGE UP (ref 0.2–1)
WBC # BLD: 8.54 K/UL — SIGNIFICANT CHANGE UP (ref 3.8–10.5)
WBC # FLD AUTO: 8.54 K/UL — SIGNIFICANT CHANGE UP (ref 3.8–10.5)
WBC UR QL: 2 /HPF — SIGNIFICANT CHANGE UP (ref 0–5)

## 2024-11-14 PROCEDURE — 99283 EMERGENCY DEPT VISIT LOW MDM: CPT

## 2024-11-14 PROCEDURE — 99284 EMERGENCY DEPT VISIT MOD MDM: CPT

## 2024-11-14 PROCEDURE — 76830 TRANSVAGINAL US NON-OB: CPT | Mod: 26

## 2024-11-14 NOTE — ED ADULT NURSE NOTE - NS ED NURSE RECORD ANOTHER HT AND WT
Joan Hagen is a 15 year old female presenting with infected toe      C/o: both big toes, & 3rd toe on LT foot  Started 1 month ago.   Onset: Gradual.     Symptoms: pain, red, swollen.   OTC meds, or treatments tried: n/a   Yes

## 2024-11-14 NOTE — ED PROVIDER NOTE - NSFOLLOWUPINSTRUCTIONS_ED_ALL_ED_FT
Advance activity as tolerated.  Continue all previously prescribed medications as directed unless otherwise instructed.  Practice pelvic rest -- no sexual intercourse, no foreign bodies, no tampons in vagina.  Follow up with your primary care physician and OB/GYN (referral list provided or call [phone] to make an appointment with the OB/GYN clinic)  in 48-72 hours- bring copies of your results.  Return to the Emergency Department for worsening or persistent symptoms, including but not limited to worsening/persistent pain, heavy vaginal bleeding requiring more than or equal to 2 pads in 1 hour, lightheadedness, passing out, chest pain, shortness of breath, and/or ANY NEW OR CONCERNING SYMPTOMS. If you have issues obtaining follow up, please call: [phone] to obtain a doctor or specialist who takes your insurance in your area.  You may call [phone] to make an appointment with the internal medicine clinic.

## 2024-11-14 NOTE — ED PROVIDER NOTE - OBJECTIVE STATEMENT
Patient is a 52-year-old female patient is a 39-year-old female with past medical history of fibroids, appendectomy, -1-0-1 with last menstrual period 2024 presents with pelvic pain and vaginal bleeding since 2024.  Patient with recent several ED visits to evaluate for pregnancy of unknown location.  Most recently on 2024, patient had transvaginal ultrasound with following impression: "Single intrauterine gestation. Fetal heart motion is not detected; however, crown-rump length has not yet reached threshold value. Follow-up recommended. The gestational sac shape is mildly irregular. Estimated gestational age of 6 week 0 day Estimated due date of  2025."  Patient was evaluated by OB/GYN team at that time and patient was recommended to follow-up with her gynecologist with whom she has an appointment on 2024.  Patient reports right-sided pelvic pain that is now resolved after taking ibuprofen.  Patient reports vaginal bleeding which has become profoundly lighter for the past 2 days described as now spotting.  Patient denies any fevers, chills, nausea, vomiting, lightheadedness, chest pain, shortness of breath, diarrhea, constipation, dysuria, cloudy urine, use of blood thinners, vaginal discharge, history of STDs. Patient is a 39-year-old female with past medical history of fibroids, appendectomy, -1-0-1 with last menstrual period 2024 presents with pelvic pain and vaginal bleeding since 2024.  Patient with recent several ED visits to evaluate for pregnancy of unknown location.  Most recently on 2024, patient had transvaginal ultrasound with following impression: "Single intrauterine gestation. Fetal heart motion is not detected; however, crown-rump length has not yet reached threshold value. Follow-up recommended. The gestational sac shape is mildly irregular. Estimated gestational age of 6 week 0 day Estimated due date of  2025."  Patient was evaluated by OB/GYN team at that time and patient was recommended to follow-up with her gynecologist with whom she has an appointment on 2024.  Patient reports right-sided pelvic pain that is now resolved after taking ibuprofen.  Patient reports vaginal bleeding which has become profoundly lighter for the past 2 days described as now spotting.  Patient denies any fevers, chills, nausea, vomiting, lightheadedness, chest pain, shortness of breath, diarrhea, constipation, dysuria, cloudy urine, use of blood thinners, vaginal discharge, history of STDs.

## 2024-11-14 NOTE — CONSULT NOTE ADULT - ASSESSMENT
38yo  LMP  presenting to the ED to confirm that she is no longer pregnant after heavy vaginal bleeding . Patient with down trending bHCG; bHCG trend: 7397 (10/28)->5297 (10/30)-> 5222(10/30)->5622()->5933 ()->6901 ()->420(). Patient with mild vaginal bleeding and pain improving since  and responds to ibuprofen. Overall clinically stable and well appearing.    -VSS reviewed, H/H  denies sxs of anemia  -Discuss with patient the nature of her clinical course and the abnormal trend in bHCG levels and reviewed negative pathology findings.  -Related that although patient had a confirmed IUP, her inappropriate bHCG trend and subsequent miscarriage were likely due to genetic abnormalities/implantation issues.   -Patient reassured with discussion about TVUS today showing no IUP or retained POC and that her HCG is downtrending.  -Patient wishes to resume oral birth-control, told patient no GYN contraindication and she should follow up for pre-conception counseling in resident clinic.  -Will email resident clinic for appt. Patient wishes to discuss hx of fibroids and adenomyosis in resident clinic in addition to pre-conception counseling.     [place] PGY3  kevon Jack

## 2024-11-14 NOTE — ED PROVIDER NOTE - CLINICAL SUMMARY MEDICAL DECISION MAKING FREE TEXT BOX
Patient is a 52-year-old female patient is a 39-year-old female with past medical history of fibroids, appendectomy, -1-0-1 with last menstrual period 2024 presents with pelvic pain and vaginal bleeding since 2024.  Patient with recent several ED visits to evaluate for pregnancy of unknown location.  Most recently on 2024, patient had transvaginal ultrasound with following impression: "Single intrauterine gestation. Fetal heart motion is not detected; however, crown-rump length has not yet reached threshold value. Follow-up recommended. The gestational sac shape is mildly irregular. Estimated gestational age of 6 week 0 day Estimated due date of  2025."  Patient was evaluated by OB/GYN team at that time and patient was recommended to follow-up with her gynecologist with whom she has an appointment on 2024.  Patient reports right-sided pelvic pain that is now resolved after taking ibuprofen.  Patient reports vaginal bleeding which has become profoundly lighter for the past 2 days described as now spotting.  Patient denies any fevers, chills, nausea, vomiting, lightheadedness, chest pain, shortness of breath, diarrhea, constipation, dysuria, cloudy urine, use of blood thinners, vaginal discharge, history of STDs.  This is a patient with pelvic pain and vaginal bleeding in pregnancy.  Plan to order labs, hCG, transvaginal ultrasound.  Disposition pending workup. Patient is a 39-year-old female with past medical history of fibroids, appendectomy, -1-0-1 with last menstrual period 2024 presents with pelvic pain and vaginal bleeding since 2024.  Patient with recent several ED visits to evaluate for pregnancy of unknown location.  Most recently on 2024, patient had transvaginal ultrasound with following impression: "Single intrauterine gestation. Fetal heart motion is not detected; however, crown-rump length has not yet reached threshold value. Follow-up recommended. The gestational sac shape is mildly irregular. Estimated gestational age of 6 week 0 day Estimated due date of  2025."  Patient was evaluated by OB/GYN team at that time and patient was recommended to follow-up with her gynecologist with whom she has an appointment on 2024.  Patient reports right-sided pelvic pain that is now resolved after taking ibuprofen.  Patient reports vaginal bleeding which has become profoundly lighter for the past 2 days described as now spotting.  Patient denies any fevers, chills, nausea, vomiting, lightheadedness, chest pain, shortness of breath, diarrhea, constipation, dysuria, cloudy urine, use of blood thinners, vaginal discharge, history of STDs.  This is a patient with pelvic pain and vaginal bleeding in pregnancy.  Plan to order labs, hCG, transvaginal ultrasound.  Disposition pending workup.

## 2024-11-14 NOTE — ED PROVIDER NOTE - PROGRESS NOTE DETAILS
FILI Parikh: I had a detailed discussion with the patient and/or guardian regarding the historical points, exam findings, and any diagnostic results supporting the discharge diagnosis. Results endorsed including unexpected incidental findings (copy of reports provided to patient).   Pt educated on care and need for follow up. Strict return instructions and red flag signs and symptoms discussed with patient. Questions answered. Pt shows understanding of discharge information and agrees to follow. Pt medically stable for discharge.  Strict return precautions given.  Pt was evaluated by OB/GYN.  OB/GYN recommends outpatient OB/GYN follow up and no further inpatient work up or intervention at this time.
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
## 2024-11-14 NOTE — ED ADULT NURSE NOTE - OBJECTIVE STATEMENT
pt. presents to wellness c/o heavy vaginal bleeding cramping and pain that can get intense x 4 days. pt. currently denies pain but pain in 10/10 at times. Pt. does endorse passing some tissue. Pt. was told this is nonviable pregnancy that will most likely end in miscarriage . had OB appt 11/20 but could not wait, no PMHx. Denies lightheadedness/dizziness CP SOB n/v/d dysuria fever/chills. LAC20G labs sent pending US res

## 2024-11-14 NOTE — ED PROVIDER NOTE - PATIENT PORTAL LINK FT
You can access the FollowMyHealth Patient Portal offered by [facility] by registering at the following website: [email or web address]. By joining [employer]’s FollowMyHealth portal, you will also be able to view your health information using other applications (apps) compatible with our system.

## 2024-11-15 LAB
CULTURE RESULTS: SIGNIFICANT CHANGE UP
SPECIMEN SOURCE: SIGNIFICANT CHANGE UP

## 2024-12-17 ENCOUNTER — APPOINTMENT (OUTPATIENT)
Dept: OBGYN | Facility: HOSPITAL | Age: 39
End: 2024-12-17
Payer: MEDICAID

## 2024-12-17 ENCOUNTER — OUTPATIENT (OUTPATIENT)
Dept: OUTPATIENT SERVICES | Facility: HOSPITAL | Age: 39
LOS: 1 days | End: 2024-12-17

## 2024-12-17 VITALS
HEART RATE: 103 BPM | DIASTOLIC BLOOD PRESSURE: 93 MMHG | SYSTOLIC BLOOD PRESSURE: 134 MMHG | BODY MASS INDEX: 24.17 KG/M2 | WEIGHT: 128 LBS | TEMPERATURE: 98.1 F | HEIGHT: 61 IN

## 2024-12-17 DIAGNOSIS — Z00.00 ENCOUNTER FOR GENERAL ADULT MEDICAL EXAMINATION W/OUT ABNORMAL FINDINGS: ICD-10-CM

## 2024-12-17 DIAGNOSIS — N80.03 ADENOMYOSIS OF THE UTERUS: ICD-10-CM

## 2024-12-17 LAB
HCG UR QL: NEGATIVE
QUALITY CONTROL: YES

## 2024-12-17 PROCEDURE — 90651 9VHPV VACCINE 2/3 DOSE IM: CPT | Mod: NC

## 2024-12-17 PROCEDURE — 99213 OFFICE O/P EST LOW 20 MIN: CPT | Mod: GC

## 2024-12-17 RX ORDER — NORETHINDRONE AND ETHINYL ESTRADIOL 1 MG-35MCG
1-35 KIT ORAL DAILY
Qty: 1 | Refills: 6 | Status: ACTIVE | COMMUNITY
Start: 2024-12-17 | End: 1900-01-01

## 2024-12-17 RX ORDER — IBUPROFEN 600 MG/1
600 TABLET, FILM COATED ORAL EVERY 6 HOURS
Qty: 30 | Refills: 3 | Status: ACTIVE | COMMUNITY
Start: 2024-12-17 | End: 1900-01-01

## 2024-12-18 DIAGNOSIS — Z00.00 ENCOUNTER FOR GENERAL ADULT MEDICAL EXAMINATION WITHOUT ABNORMAL FINDINGS: ICD-10-CM

## 2024-12-19 PROBLEM — N80.03 ADENOMYOSIS: Status: ACTIVE | Noted: 2024-12-17

## 2025-04-17 ENCOUNTER — APPOINTMENT (OUTPATIENT)
Dept: OBGYN | Facility: HOSPITAL | Age: 40
End: 2025-04-17